# Patient Record
Sex: FEMALE | Race: WHITE
[De-identification: names, ages, dates, MRNs, and addresses within clinical notes are randomized per-mention and may not be internally consistent; named-entity substitution may affect disease eponyms.]

---

## 2019-08-07 ENCOUNTER — HOSPITAL ENCOUNTER (INPATIENT)
Dept: HOSPITAL 88 - ER | Age: 84
LOS: 3 days | Discharge: HOME | DRG: 291 | End: 2019-08-10
Attending: INTERNAL MEDICINE | Admitting: INTERNAL MEDICINE
Payer: MEDICARE

## 2019-08-07 VITALS — DIASTOLIC BLOOD PRESSURE: 67 MMHG | SYSTOLIC BLOOD PRESSURE: 149 MMHG

## 2019-08-07 VITALS — HEIGHT: 63 IN | BODY MASS INDEX: 28.71 KG/M2 | WEIGHT: 162.06 LBS

## 2019-08-07 VITALS — SYSTOLIC BLOOD PRESSURE: 149 MMHG | DIASTOLIC BLOOD PRESSURE: 67 MMHG

## 2019-08-07 DIAGNOSIS — G62.9: ICD-10-CM
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
## 2019-08-07 DIAGNOSIS — K21.9: ICD-10-CM

## 2019-08-07 DIAGNOSIS — I45.2: ICD-10-CM

## 2019-08-07 DIAGNOSIS — Z91.81: ICD-10-CM

## 2019-08-07 DIAGNOSIS — J44.1: ICD-10-CM

## 2019-08-07 DIAGNOSIS — F32.9: ICD-10-CM

## 2019-08-07 DIAGNOSIS — I50.33: ICD-10-CM

## 2019-08-07 DIAGNOSIS — E86.0: ICD-10-CM

## 2019-08-07 DIAGNOSIS — N28.9: ICD-10-CM

## 2019-08-07 DIAGNOSIS — N14.1: ICD-10-CM

## 2019-08-07 DIAGNOSIS — R53.1: ICD-10-CM

## 2019-08-07 DIAGNOSIS — J98.6: ICD-10-CM

## 2019-08-07 DIAGNOSIS — Z87.891: ICD-10-CM

## 2019-08-07 DIAGNOSIS — I95.1: ICD-10-CM

## 2019-08-07 DIAGNOSIS — N18.3: ICD-10-CM

## 2019-08-07 DIAGNOSIS — N32.81: ICD-10-CM

## 2019-08-07 DIAGNOSIS — M81.0: ICD-10-CM

## 2019-08-07 DIAGNOSIS — Z79.82: ICD-10-CM

## 2019-08-07 DIAGNOSIS — M15.9: ICD-10-CM

## 2019-08-07 DIAGNOSIS — Z99.81: ICD-10-CM

## 2019-08-07 DIAGNOSIS — T50.8X5A: ICD-10-CM

## 2019-08-07 DIAGNOSIS — I13.0: Primary | ICD-10-CM

## 2019-08-07 LAB
ALBUMIN SERPL-MCNC: 4.4 G/DL (ref 3.5–5)
ALBUMIN/GLOB SERPL: 1.2 {RATIO} (ref 0.8–2)
ALP SERPL-CCNC: 43 IU/L (ref 40–150)
ALT SERPL-CCNC: 14 IU/L (ref 0–55)
ANION GAP SERPL CALC-SCNC: 21.3 MMOL/L (ref 8–16)
BACTERIA URNS QL MICRO: (no result) /HPF
BASOPHILS # BLD AUTO: 0.1 10*3/UL (ref 0–0.1)
BASOPHILS NFR BLD AUTO: 0.5 % (ref 0–1)
BILIRUB UR QL: NEGATIVE
BUN SERPL-MCNC: 39 MG/DL (ref 7–26)
BUN/CREAT SERPL: 16 (ref 6–25)
CALCIUM SERPL-MCNC: 10.6 MG/DL (ref 8.4–10.2)
CHLORIDE SERPL-SCNC: 96 MMOL/L (ref 98–107)
CK MB SERPL-MCNC: 4.5 NG/ML (ref 0–5)
CK SERPL-CCNC: 175 IU/L (ref 30–200)
CLARITY UR: (no result)
CO2 SERPL-SCNC: 29 MMOL/L (ref 22–29)
COARSE GRAN CASTS URNS QL MICRO: (no result)
COLOR UR: YELLOW
DEPRECATED APTT PLAS QN: 25.5 SECONDS (ref 23.8–35.5)
DEPRECATED INR PLAS: 0.91
DEPRECATED NEUTROPHILS # BLD AUTO: 7.1 10*3/UL (ref 2.1–6.9)
DEPRECATED RBC URNS MANUAL-ACNC: (no result) /HPF (ref 0–5)
EGFRCR SERPLBLD CKD-EPI 2021: 25 ML/MIN (ref 60–?)
EOSINOPHIL # BLD AUTO: 0.2 10*3/UL (ref 0–0.4)
EOSINOPHIL NFR BLD AUTO: 2.3 % (ref 0–6)
EPI CELLS URNS QL MICRO: (no result) /LPF
ERYTHROCYTE [DISTWIDTH] IN CORD BLOOD: 13.5 % (ref 11.7–14.4)
GLOBULIN PLAS-MCNC: 3.6 G/DL (ref 2.3–3.5)
GLUCOSE SERPLBLD-MCNC: 93 MG/DL (ref 74–118)
HCT VFR BLD AUTO: 40.9 % (ref 38.2–49.6)
HGB BLD-MCNC: 12.9 G/DL (ref 14–18)
HYALINE CASTS #/AREA URNS LPF: (no result) /[LPF] (ref 0–1)
KETONES UR QL STRIP.AUTO: NEGATIVE
LEUKOCYTE ESTERASE UR QL STRIP.AUTO: NEGATIVE
LYMPHOCYTES # BLD: 2 10*3/UL (ref 1–3.2)
LYMPHOCYTES NFR BLD AUTO: 19.6 % (ref 18–39.1)
MCH RBC QN AUTO: 29.2 PG (ref 28–32)
MCHC RBC AUTO-ENTMCNC: 31.5 G/DL (ref 31–35)
MCV RBC AUTO: 92.5 FL (ref 81–99)
MONOCYTES # BLD AUTO: 0.9 10*3/UL (ref 0.2–0.8)
MONOCYTES NFR BLD AUTO: 8.6 % (ref 4.4–11.3)
NEUTS SEG NFR BLD AUTO: 68.7 % (ref 38.7–80)
NITRITE UR QL STRIP.AUTO: NEGATIVE
PLATELET # BLD AUTO: 362 X10E3/UL (ref 140–360)
POTASSIUM SERPL-SCNC: 4.3 MMOL/L (ref 3.5–5.1)
PROT UR QL STRIP.AUTO: NEGATIVE
PROTHROMBIN TIME: 12.7 SECONDS (ref 11.9–14.5)
RBC # BLD AUTO: 4.42 X10E6/UL (ref 4.3–5.7)
SODIUM SERPL-SCNC: 142 MMOL/L (ref 136–145)
SP GR UR STRIP: 1.01 (ref 1.01–1.02)
TSH SERPL DL<=0.005 MIU/L-ACNC: 2.77 UIU/ML (ref 0.35–4.94)
UROBILINOGEN UR STRIP-MCNC: 0.2 MG/DL (ref 0.2–1)
WBC #/AREA URNS HPF: (no result) /HPF (ref 0–5)

## 2019-08-07 PROCEDURE — 80053 COMPREHEN METABOLIC PANEL: CPT

## 2019-08-07 PROCEDURE — 80061 LIPID PANEL: CPT

## 2019-08-07 PROCEDURE — 87086 URINE CULTURE/COLONY COUNT: CPT

## 2019-08-07 PROCEDURE — 97139 UNLISTED THERAPEUTIC PX: CPT

## 2019-08-07 PROCEDURE — 83880 ASSAY OF NATRIURETIC PEPTIDE: CPT

## 2019-08-07 PROCEDURE — 80048 BASIC METABOLIC PNL TOTAL CA: CPT

## 2019-08-07 PROCEDURE — 84443 ASSAY THYROID STIM HORMONE: CPT

## 2019-08-07 PROCEDURE — 94640 AIRWAY INHALATION TREATMENT: CPT

## 2019-08-07 PROCEDURE — 85025 COMPLETE CBC W/AUTO DIFF WBC: CPT

## 2019-08-07 PROCEDURE — 87040 BLOOD CULTURE FOR BACTERIA: CPT

## 2019-08-07 PROCEDURE — 83605 ASSAY OF LACTIC ACID: CPT

## 2019-08-07 PROCEDURE — 85730 THROMBOPLASTIN TIME PARTIAL: CPT

## 2019-08-07 PROCEDURE — 82553 CREATINE MB FRACTION: CPT

## 2019-08-07 PROCEDURE — 99284 EMERGENCY DEPT VISIT MOD MDM: CPT

## 2019-08-07 PROCEDURE — 82550 ASSAY OF CK (CPK): CPT

## 2019-08-07 PROCEDURE — 81001 URINALYSIS AUTO W/SCOPE: CPT

## 2019-08-07 PROCEDURE — 71045 X-RAY EXAM CHEST 1 VIEW: CPT

## 2019-08-07 PROCEDURE — 85610 PROTHROMBIN TIME: CPT

## 2019-08-07 PROCEDURE — 84484 ASSAY OF TROPONIN QUANT: CPT

## 2019-08-07 PROCEDURE — 70450 CT HEAD/BRAIN W/O DYE: CPT

## 2019-08-07 PROCEDURE — 36415 COLL VENOUS BLD VENIPUNCTURE: CPT

## 2019-08-07 PROCEDURE — 93306 TTE W/DOPPLER COMPLETE: CPT

## 2019-08-07 PROCEDURE — 93005 ELECTROCARDIOGRAM TRACING: CPT

## 2019-08-07 RX ADMIN — SODIUM CHLORIDE SCH MLS/HR: 9 INJECTION, SOLUTION INTRAVENOUS at 20:25

## 2019-08-07 NOTE — XMS REPORT
Patient Summary Document

                             Created on: 2019



JARRETT BEY

External Reference #: [id number]

: 1935

Sex: Female



Demographics







                          Address                   [address] [place]  [address]

 

                          Home Phone                [phone]

 

                          Preferred Language        Unknown

 

                          Marital Status            Unknown

 

                          Scientologist Affiliation     Unknown

 

                          Race                      Unknown

 

                                        Additional Race(s)  

 

                          Ethnic Group              Unknown





Author







                          Author                    [facility]

 

                          [facility]

 

                          Address                   Unknown

 

                          Phone                     Unavailable







Care Team Providers







                    Care Team Member Name    Role                Phone

 

                    SWEET, A LAIRD      Unavailable         Unavailable







Problems

This patient has no known problems.



Allergies, Adverse Reactions, Alerts

This patient has no known allergies or adverse reactions.



Medications

This patient has no known medications.



Encounters







             Start Date/Time    End Date/Time    Encounter Type    Admission Type    Attending [facility]

                    Care Facility       Care Department     Encounter ID

 

        2019-07-10 07:48:00    2019-07-10 07:48:00    Emergency    E               [facility]    [id number]

 

        2019 07:27:00    2019 07:27:00    [id number]

 

        2019 15:06:00    2019 15:06:00    Outpatient                    [facility]    [facility]     7513

 

        2019 13:50:00    2019 13:50:00    Outpatient                    [facility]    [facility]     [id number]







Results







           Test Description    Test Time    Test Comments    Text Results    Atomic Results    Result

 Comments

 

                CT BRAIN WO     2019 17:21:00                                                               

                                           [place] [address]    
 Patient Name: JARRETT BEY                                   MR #: 
[id number]                     : 1935                                  
Age/Sex: 83/F  Acct #: [id number]                              Req #: [id number]
[id number]  Adm Physician:                                                      
Ordered by: NICHOLAS NEIL NP                            Report #: [id number]  
     Location: ER                                      Room/Bed:                
    
___________________________________________________________________________________________________
   Procedure: [id number] CT/CT BRAIN WO  Exam Date: 19                     
      Exam Time: 1657                                              REPORT 
STATUS: Signed    Examination: CT head without contrast   Clinical Indication: 
Fainting. Headache.   Technique: Transaxial noncontrast images from the skull 
base through the vertex   were obtained. Sagittal and coronal reformatted images
were done.   Dose modulation, iterative reconstruction, and/or weight based 
adjustment of   the mA/kV was utilized to reduce the radiation dose to as low as
reasonably   achievable.    Comparison: None.      Findings:      Scalp: No 
abnormalities.   Bones: Intact. No fractures.  No blastic or lytic lesions.     
 Brain sulci: Appropriate for patient's age.   Ventricles: Normal in size and 
configuration.   No hydrocephalus.  .      Extra-axial space:   No 
abnormalities.      Parenchyma:    There are mild confluent areas of low-
attenuation within subcortical and   periventricular white matter, nonspecific, 
but could represent microvascular   ischemic disease.   No masses, hemorrhage, 
or acute or chronic cortical based vascular insults.      Suprasellar region: No
abnormalities.   Craniocervical junction: The foramen magnum is patent.  No Chi
jt one   malformation.      Incidental findings:    Atherosclerotic 
calcification of the supraclinoid internal carotid arteries.      Impression:   
  1.  No acute intracranial finding.      2.  Mild chronic microvascular 
ischemic change.      Signed by: Dr. Zaira Panchal M.D. on 2019 5:25 PM 
      Dictated By: ZAIRA GARCIA MD  Electronically Signed By: 
ZAIRA GARCIA MD on 19  Transcribed By: PAWEL on 
19       COPY TO:   NICHOLAS NEIL NP              

 

                CHEST SINGLE (PORTABLE)    2019 17:10:00                                                   

                                                       [place] [address]      Patient Name: JARRETT BEY                       
           MR #: [id number]                     : 1935                 
                 Age/Sex: 83/F  Acct #: [id number]                            
 Req #: [id number]  Adm Physician:                                              
       Ordered by: NICHOLAS NEIL NP                            Report #: 
[id number]        Location: ER                                      Room/Bed:    
                
___________________________________________________________________________________________________
   Procedure: [id number] DX/CHEST SINGLE (PORTABLE)  Exam Date: 19         
                  Exam Time: 1701                                              
REPORT STATUS: Signed    EXAMINATION:  CHEST SINGLE (PORTABLE)          IN
DICATION: Shortness of breath      COMPARISON: None           FINDINGS:      
LINES/TUBES:EKG. Sternotomy wires intact.      LUNGS:The lungs are moderately 
inflated. There is marked elevation of the right   hemidiaphragm. No focal 
consolidation or pulmonary edema.      PLEURA:No pleural effusion or 
pneumothorax.      MEDIASTINUM:The cardiomediastinal silhouette appears normal 
in size and shape.   Atherosclerotic calcifications of the thoracic aorta.      
BONES/SOFT TISSUES:No acute osseous injury.      ABDOMEN:No free air under the 
diaphragm.         IMPRESSION:    No focal pneumonia or pulmonary edema.      
Marked elevation of the right hemidiaphragm.      Signed by: Lilia Pena MD on 
2019 5:12 PM        Dictated By: LILIA PENA MD  Electronically Signed By: 
LILIA PENA MD on 19  Transcribed By: PAWEL on 19       
COPY TO:   NICHOLAS NIEL NP

## 2019-08-07 NOTE — DIAGNOSTIC IMAGING REPORT
Examination: CT head without contrast

Clinical Indication: Fainting. Headache.

Technique: Transaxial noncontrast images from the skull base through the vertex

were obtained. Sagittal and coronal reformatted images were done.

Dose modulation, iterative reconstruction, and/or weight based adjustment of

the mA/kV was utilized to reduce the radiation dose to as low as reasonably

achievable. 

Comparison: None.



Findings:



Scalp: No abnormalities.

Bones: Intact. No fractures.  No blastic or lytic lesions. 



Brain sulci: Appropriate for patient's age.

Ventricles: Normal in size and configuration.   No hydrocephalus.  .



Extra-axial space:

No abnormalities.



Parenchyma: 

There are mild confluent areas of low-attenuation within subcortical and

periventricular white matter, nonspecific, but could represent microvascular

ischemic disease.

No masses, hemorrhage, or acute or chronic cortical based vascular insults.



Suprasellar region: No abnormalities.

Craniocervical junction: The foramen magnum is patent.  No Chiari one

malformation.



Incidental findings: 

Atherosclerotic calcification of the supraclinoid internal carotid arteries.



Impression:



1.  No acute intracranial finding.



2.  Mild chronic microvascular ischemic change.



Signed by: Dr. Zaira Panchal M.D. on 8/7/2019 5:25 PM

## 2019-08-07 NOTE — XMS REPORT
Continuity of Care Document

                             Created on: 2019



JARRETT BEY

External Reference #: [id number]

: 1935

Sex: Female



Demographics







                          Address                   [address] [place] [address]  [address]

 

                          Home Phone                [phone]

 

                          Preferred Language        English

 

                          Marital Status            Unknown

 

                          Amish Affiliation     Unknown

 

                          Race                      Unknown

 

                          Ethnic Group              Unknown





Author







                          Author                    [employer]

 

                          Address                   Unknown

 

                          Phone                     Unavailable







Care Team Providers







                    Care Team Member Name    Role                Phone

 

                    [email or web address]    Unavailable         Unavailable



                                    



Problems

                    





                    Problem                            Status                            Onset Date     

                          Classification                            Date Reported       

                          Comments                            Source                    

 

                          Right bundle branch block                            Active                       

                                                Problem                            2016         

                                                      Rakesh Zaman [facility], [place]                  

  

 

                    Aortic valve disorders                            Active                            

                            Problem                            2016            

                                                      Rakesh Zaman MD, [place]                    

 

                          Chronic diastolic heart failure                            Active                 

                                                Problem                            2016   

                                                      Rakesh Zaman MD, [facility]            

        

 

                          Chronic diastolic heart failure                            Active                 

                                                Problem                            2016   

                                                      Rakesh Zaman MD, [place]            

        

 

                          Unspecified right bundle-branch block                            Active           

                                                Problem                            2016

                                                        Rakesh Zaman MD, [facility]         

           

 

                          Obstructive hypertrophic cardiomyopathy                            Active         

                                                Problem                            2016

                                                        Rakesh Zaman MD, [place]         

           

 

                          Orthostatic hypotension                            Active                         

                                                Problem                            2016           

                                                      Rakesh Zaman MD, [place]                    



 

                    Angina pectoris                            Active                                   

                          Problem                            2016                   

                                                      Rakesh Zaman MD, [place]                    

 

                          Orthostatic hypotension                            Active                         

                                                Problem                            2016           

                                                      Rakesh Zaman MD, [place]                    



 

                          Chronic obstructive pulmonary disease, unspecified                            Active

                                                        Problem                      

                    2016                                                        Rakesh Zaman MD,

 [place]                    



                                                                                
                                                                                
                                                      



Medications

        





                                        No Data Provided for This Section                    



                                                                



Allergies, Adverse Reactions, Alerts

        





                                        No Known Medication Allergies                      



                                                        



Immunizations

        





                                        No Data Provided for This Section



                                     



Results







                                        No Data Provided for This Section



                    



Pathology Reports







                                        No Data Provided for This Section                    



                            



Diagnostic Reports

            





                                        No Data Provided for This Section                    



                                                            



Consultation Notes

                    





                                        No Data Provided for This Section                    



                                                            



Discharge Summaries

                    





                                        No Data Provided for This Section                    



                                                            



History and Physicals

                    





                                        No Data Provided for This Section                    



                                                                



Vital Signs

                         





                                        No Data Provided for This Section



                                                                 



Encounters

                    





                    Location                            Location Details                            Encounter

 Type                            Encounter Number                            Reason For

 Visit                            Attending Provider                            ADM Date

                            DC Date                            Status                

                                        Source                    

 

                    Rakesh Zaman MD, [facility]                                                        Follow-Up

                                        [id number]                   

                                                                              10/07/2015          

                    10/07/2015                                                        Rakesh Zaman MD, [facility]                    

 

                    Rakesh Zaman MD, PA                                                        reschedule

 appt                                   [id number]              

                                                                              11/10/2016     

                          11/10/2016                                                    

                                        Rakesh Zaman MD, [facility]                    



                                                                                
   



Procedures

        





                                        No Data Provided for This Section



                                                    



Assessment and Plan

                    





                                        No Data Provided for This Section                    



                                     



Plan of Care







                                        No Data Provided for This Section                    



                                                                



Social History

                    





                    Social History                            Date                            Source    

                

 

                                        Social History ElementQualifiersDate Reported

Tobacco Use:

                                        .  Are you a: former smoker quit around 1990

Oct 07, 2015

Marital Status:

                                        .   as of 2013.

Oct 07, 2015

Do you drink alcohol?

                                        .  Status: No

Oct 07, 2015

                            10/07/2015                            Rakesh Zaman MD, [place]

                    



                                                                    



Family History

                    





                                        No Data Provided for This Section                    



                                                            



Advance Directives

                    





                                        No Data Provided for This Section                    



                                                            



Functional Status

                    





                                        No Data Provided for This Section

## 2019-08-07 NOTE — NUR
Received report from LEAH Frias nurse. Patient is A&Ox3. Patient in no pain or distress. 
Call light within reach. Daughter at bedside.

## 2019-08-07 NOTE — DIAGNOSTIC IMAGING REPORT
EXAMINATION:  CHEST SINGLE (PORTABLE)    



INDICATION: Shortness of breath



COMPARISON: None

     

FINDINGS:



LINES/TUBES:EKG. Sternotomy wires intact.



LUNGS:The lungs are moderately inflated. There is marked elevation of the right

hemidiaphragm. No focal consolidation or pulmonary edema.



PLEURA:No pleural effusion or pneumothorax.



MEDIASTINUM:The cardiomediastinal silhouette appears normal in size and shape.

Atherosclerotic calcifications of the thoracic aorta.



BONES/SOFT TISSUES:No acute osseous injury.



ABDOMEN:No free air under the diaphragm.





IMPRESSION: 

No focal pneumonia or pulmonary edema.



Marked elevation of the right hemidiaphragm.



Signed by: Christoph Hidalgo MD on 8/7/2019 5:12 PM

## 2019-08-07 NOTE — NUR
STRAIGHT CATH INSERTED VIA ASEPTIC TECHNIQUE FOR UA PER MD ORDER; PT URINE 
OUTPUT APPROX 50 CC; UA COLLECTED AND SENT TO LAB

## 2019-08-07 NOTE — XMS REPORT
Rakesh Zaman MD, [facility]

                             Created on: 2016



Brigida Martin

External Reference #: [id number]

: 1935

Sex: Female



Demographics







                          Address                   [address] [place]  [address]

 

                          Home Phone                [phone]

 

                          Preferred Language        Unknown

 

                          Marital Status            Unknown

 

                          Mandaen Affiliation     Unknown

 

                          Race                      Unknown

 

                          Ethnic Group              Non-





Author







                          Author                    Rakesh Zaman

 

                          Organization              [employer]

 

                          Address                   Unknown

 

                          Phone                     Unavailable







Care Team Providers







                    Care Team Member Name    Role                Phone

 

                    Rakesh Zaman    CP                  Unavailable



                                            



Encounters

          





                    Encounter           Location            Date

 

                    Follow-Up           [facility]    Oct 07, 2015

 

                    reschedule appt     [facility], [facility]    Nov 10, 2016



                                                                                
                 



Problems

          





             Problem Type    Condition    ICD-9 Code    Onset Dates    Condition Status

 

             Problem      Right bundle branch block    426.4                     Active

 

             Problem      Aortic valve disorders    424.1                     Active

 

             Problem      Chronic diastolic heart failure    428.32                    Active

 

             Problem      Chronic diastolic (congestive) heart failure    I50.32                    Active

 

             Problem      Unspecified right bundle-branch block    I45.10                    Active

 

             Problem      Obstructive hypertrophic cardiomyopathy    I42.1                     Active

 

             Problem      Orthostatic hypotension    458.0                     Active

 

             Problem      Angina pectoris    413.9                     Active

 

             Problem      Orthostatic hypotension    I95.1                     Active

 

             Problem      Chronic obstructive pulmonary disease, unspecified    J44.9                     Active



                                                                                
                                                                                
                



Social History

          





                    Social History Element    Qualifiers          Date Reported

 

                    Tobacco Use:        .  Are you a: former smoker quit around 1990    Oct 07, 2015

 

                    Marital Status:     .   as of 2013.    Oct 07, 2015

 

                    Do you drink alcohol?    .  Status: No       Oct 07, 2015



                                                                                
       



Summary Purpose

          [employer] Submission

## 2019-08-07 NOTE — XMS REPORT
Continuity of Care Document

                             Created on: 2019



JARRETT BEY

External Reference #: [id number]

: 1935

Sex: Female



Demographics







                          Address                   [address] [place] [address]  [id number]

 

                          Home Phone                [phone]

 

                          Preferred Language        English

 

                          Marital Status            Unknown

 

                          Mandaeism Affiliation     Unknown

 

                          Race                      Unknown

 

                          Ethnic Group              Unknown





Author







                          Author                    [employer]

 

                          Address                   Unknown

 

                          Phone                     Unavailable







Care Team Providers







                    Care Team Member Name    Role                Phone

 

                    [employer]    Unavailable         Unavailable



                                    



Problems

                    





                    Problem                            Status                            Onset Date     

                          Classification                            Date Reported       

                          Comments                            Source                    

 

                          Right bundle branch block                            Active                       

                                                Problem                            2016         

                                                      Rakesh Zaman MD, PA                  

  

 

                    Aortic valve disorders                            Active                            

                            Problem                            2016            

                                                      Rakesh Zaman MD, PA                    

 

                          Chronic diastolic heart failure                            Active                 

                                                Problem                            2016   

                                                      Rakesh Zaman MD, PA            

        

 

                          Chronic diastolic heart failure                            Active                 

                                                Problem                            2016   

                                                      Rakesh Zaman MD, PA            

        

 

                          Unspecified right bundle-branch block                            Active           

                                                Problem                            2016

                                                        Rakesh Zaman MD, PA         

           

 

                          Obstructive hypertrophic cardiomyopathy                            Active         

                                                Problem                            2016

                                                        Rakesh Zaman MD, PA         

           

 

                          Orthostatic hypotension                            Active                         

                                                Problem                            2016           

                                                      Rakesh Zaman MD, PA                    



 

                    Angina pectoris                            Active                                   

                          Problem                            2016                   

                                                      Rakesh Zaman MD, PA                    

 

                          Orthostatic hypotension                            Active                         

                                                Problem                            2016           

                                                      Rakesh Zaman MD, PA                    



 

                          Chronic obstructive pulmonary disease, unspecified                            Active

                                                        Problem                      

                    2016                                                        Rakesh Zaman MD,

 PA                    



                                                                                
                                                                                
                                                      



Medications

        





                                        No Data Provided for This Section                    



                                                                



Allergies, Adverse Reactions, Alerts

        





                                        No Known Medication Allergies                      



                                                        



Immunizations

        





                                        No Data Provided for This Section



                                     



Results







                                        No Data Provided for This Section



                    



Pathology Reports







                                        No Data Provided for This Section                    



                            



Diagnostic Reports

            





                                        No Data Provided for This Section                    



                                                            



Consultation Notes

                    





                                        No Data Provided for This Section                    



                                                            



Discharge Summaries

                    





                                        No Data Provided for This Section                    



                                                            



History and Physicals

                    





                                        No Data Provided for This Section                    



                                                                



Vital Signs

                         





                                        No Data Provided for This Section



                                                                 



Encounters

                    





                    Location                            Location Details                            Encounter

 Type                            Encounter Number                            Reason For

 Visit                            Attending Provider                            ADM Date

                            DC Date                            Status                

                                        Source                    

 

                    Rakesh Zaman MD, PA                                                        Follow-Up

                                        [id number]                   

                                                                              10/07/2015          

                    10/07/2015                                                        Rakesh Zaman MD, PA                    

 

                    Rakesh Zaman MD, PA                                                        reschedule

 appt                                   [id number]              

                                                                              11/10/2016     

                          11/10/2016                                                    

                                        Rakesh Zaman MD, PA                    



                                                                                
   



Procedures

        





                                        No Data Provided for This Section



                                                    



Assessment and Plan

                    





                                        No Data Provided for This Section                    



                                     



Plan of Care







                                        No Data Provided for This Section                    



                                                                



Social History

                    





                    Social History                            Date                            Source    

                

 

                                        Social History ElementQualifiersDate Reported

Tobacco Use:

                                        .  Are you a: former smoker quit around 1990

Oct 07, 2015

Marital Status:

                                        .   as of 2013.

Oct 07, 2015

Do you drink alcohol?

                                        .  Status: No

Oct 07, 2015

                            10/07/2015                            Rakesh Zaman MD, PA

                    



                                                                    



Family History

                    





                                        No Data Provided for This Section                    



                                                            



Advance Directives

                    





                                        No Data Provided for This Section                    



                                                            



Functional Status

                    





                                        No Data Provided for This Section

## 2019-08-08 VITALS — DIASTOLIC BLOOD PRESSURE: 58 MMHG | SYSTOLIC BLOOD PRESSURE: 128 MMHG

## 2019-08-08 VITALS — DIASTOLIC BLOOD PRESSURE: 63 MMHG | SYSTOLIC BLOOD PRESSURE: 124 MMHG

## 2019-08-08 VITALS — DIASTOLIC BLOOD PRESSURE: 66 MMHG | SYSTOLIC BLOOD PRESSURE: 149 MMHG

## 2019-08-08 VITALS — SYSTOLIC BLOOD PRESSURE: 103 MMHG | DIASTOLIC BLOOD PRESSURE: 51 MMHG

## 2019-08-08 VITALS — DIASTOLIC BLOOD PRESSURE: 58 MMHG | SYSTOLIC BLOOD PRESSURE: 122 MMHG

## 2019-08-08 VITALS — DIASTOLIC BLOOD PRESSURE: 60 MMHG | SYSTOLIC BLOOD PRESSURE: 127 MMHG

## 2019-08-08 LAB
ALBUMIN SERPL-MCNC: 3.1 G/DL (ref 3.5–5)
ALBUMIN/GLOB SERPL: 1.2 {RATIO} (ref 0.8–2)
ALP SERPL-CCNC: 34 IU/L (ref 40–150)
ALT SERPL-CCNC: 10 IU/L (ref 0–55)
ANION GAP SERPL CALC-SCNC: 14.8 MMOL/L (ref 8–16)
BASOPHILS # BLD AUTO: 0 10*3/UL (ref 0–0.1)
BASOPHILS NFR BLD AUTO: 0.4 % (ref 0–1)
BUN SERPL-MCNC: 32 MG/DL (ref 7–26)
BUN/CREAT SERPL: 19 (ref 6–25)
CALCIUM SERPL-MCNC: 8.6 MG/DL (ref 8.4–10.2)
CHLORIDE SERPL-SCNC: 105 MMOL/L (ref 98–107)
CHOLEST SERPL-MCNC: 149 MD/DL (ref 0–199)
CHOLEST/HDLC SERPL: 3.5 {RATIO} (ref 3–3.6)
CK MB SERPL-MCNC: 4 NG/ML (ref 0–5)
CK MB SERPL-MCNC: 5 NG/ML (ref 0–5)
CK MB SERPL-MCNC: 5.1 NG/ML (ref 0–5)
CK SERPL-CCNC: 101 IU/L (ref 29–168)
CK SERPL-CCNC: 104 IU/L (ref 29–168)
CK SERPL-CCNC: 114 IU/L (ref 29–168)
CO2 SERPL-SCNC: 25 MMOL/L (ref 22–29)
DEPRECATED NEUTROPHILS # BLD AUTO: 4.5 10*3/UL (ref 2.1–6.9)
EGFRCR SERPLBLD CKD-EPI 2021: 29 ML/MIN (ref 60–?)
EOSINOPHIL # BLD AUTO: 0.3 10*3/UL (ref 0–0.4)
EOSINOPHIL NFR BLD AUTO: 3.7 % (ref 0–6)
ERYTHROCYTE [DISTWIDTH] IN CORD BLOOD: 13.5 % (ref 11.7–14.4)
GLOBULIN PLAS-MCNC: 2.6 G/DL (ref 2.3–3.5)
GLUCOSE SERPLBLD-MCNC: 94 MG/DL (ref 74–118)
HCT VFR BLD AUTO: 33.2 % (ref 34.2–44.1)
HDLC SERPL-MSCNC: 42 MG/DL (ref 40–60)
HGB BLD-MCNC: 10.2 G/DL (ref 12–16)
LDLC SERPL CALC-MCNC: 70 MG/DL (ref 60–130)
LYMPHOCYTES # BLD: 1.9 10*3/UL (ref 1–3.2)
LYMPHOCYTES NFR BLD AUTO: 25.8 % (ref 18–39.1)
MCH RBC QN AUTO: 28.6 PG (ref 28–32)
MCHC RBC AUTO-ENTMCNC: 30.7 G/DL (ref 31–35)
MCV RBC AUTO: 93 FL (ref 81–99)
MONOCYTES # BLD AUTO: 0.7 10*3/UL (ref 0.2–0.8)
MONOCYTES NFR BLD AUTO: 8.8 % (ref 4.4–11.3)
NEUTS SEG NFR BLD AUTO: 60.8 % (ref 38.7–80)
PLATELET # BLD AUTO: 265 X10E3/UL (ref 140–360)
POTASSIUM SERPL-SCNC: 3.8 MMOL/L (ref 3.5–5.1)
RBC # BLD AUTO: 3.57 X10E6/UL (ref 3.6–5.1)
SODIUM SERPL-SCNC: 141 MMOL/L (ref 136–145)
TRIGL SERPL-MCNC: 186 MG/DL (ref 0–149)

## 2019-08-08 RX ADMIN — SODIUM CHLORIDE SCH MLS/HR: 9 INJECTION, SOLUTION INTRAVENOUS at 04:20

## 2019-08-08 RX ADMIN — IPRATROPIUM BROMIDE AND ALBUTEROL SULFATE SCH ML: .5; 2.5 SOLUTION RESPIRATORY (INHALATION) at 11:00

## 2019-08-08 RX ADMIN — IPRATROPIUM BROMIDE AND ALBUTEROL SULFATE SCH ML: .5; 2.5 SOLUTION RESPIRATORY (INHALATION) at 15:50

## 2019-08-08 RX ADMIN — IPRATROPIUM BROMIDE AND ALBUTEROL SULFATE SCH ML: .5; 2.5 SOLUTION RESPIRATORY (INHALATION) at 08:30

## 2019-08-08 RX ADMIN — FAMOTIDINE SCH MG: 10 INJECTION, SOLUTION INTRAVENOUS at 21:07

## 2019-08-08 RX ADMIN — IPRATROPIUM BROMIDE AND ALBUTEROL SULFATE SCH ML: .5; 2.5 SOLUTION RESPIRATORY (INHALATION) at 20:10

## 2019-08-08 RX ADMIN — FAMOTIDINE SCH MG: 10 INJECTION, SOLUTION INTRAVENOUS at 10:21

## 2019-08-08 RX ADMIN — IPRATROPIUM BROMIDE AND ALBUTEROL SULFATE SCH ML: .5; 2.5 SOLUTION RESPIRATORY (INHALATION) at 23:00

## 2019-08-08 NOTE — NUR
ASSESSMENT: Spiritual concern

 referred by RN thru consult request. Pt dismayed by new diagnosis. Pt states she 
wasn't aware of the extent of her illness until hospitalization. Pt states she has two 
daughters, one with physical needs who lives with her and the other who "lives in [place]."



Intervention:  Provided unhurried empathic listening. Facilitated illness review and 
identification of resources. Provided information on how to contact , if needed.



Outcome: Pt expressed appreciation for visit. No need to follow at this time.



MITCH MURPHY



Spiritual Care Department

O: [phone]

Pager: [phone] ([phone] calling [phone])

## 2019-08-08 NOTE — NUR
REC'D PT AAOX3, ON O2 VIA NC AT 4L/MIN. NO S/S OF DISTRESS. WALKER AT BEDSIDE. IV FLUIDS 
RUNNING. BED IN LOWEST POSITION, SIDE RAILS UPX2, AND CALL BELL WITHIN REACH.

## 2019-08-08 NOTE — HISTORY AND PHYSICAL
CHIEF COMPLAINT:  "I keep falling down."

 

HISTORY OF PRESENT ILLNESS:  This is an 83-year-old white woman, who presents to
[facility] Emergency Room with a 2 to 3-day history of 
worsening

generalized weakness and complaints of frequent falls.  The patient also states 
that

since last night, she become more short of breath.  When she arrived in the 
emergency

room last night, she was found to have BUN and creatinine of 39 and 2.5 
respectively.

The patient had blood work done in 2018, at that time was found to 
have a BUN

and creatinine of 22 and 1.25 respectively.  However, 10 days ago, the patient 
underwent

a right and left heart catheterization at [facility] in the [facility].  Also in the emergency room, the patient was found to have white blood 
cell

count 10,300 with 68% segmented neutrophils.  The patient's hemoglobin is 12.9 
g/dL.

This morning after intravenous fluids, the patient's BUN and creatinine did 
improve to

32 and 1.69 respectively.  The patient underwent a 12-lead EKG in the emergency 
room

that revealed normal sinus rhythm with bifascicular block.  The patient had 
three sets

of cardiac enzymes, namely troponin I in the emergency room that were not 
consistent for

acute myocardial ischemia or infarction.  The patient's B-type natriuretic 
peptide level

was slightly elevated at 108.  The patient's LDL cholesterol was 70 mg/dL.  
Chest x-ray

performed in the emergency room revealed no focal pneumonia or pulmonary edema, 
but this

is prior to the intravenous fluids administered overnight.  CT of the brain 
without

contrast in the emergency room did not reveal any acute intracranial finding.  
The

patient was admitted for further evaluation and treatment. 

 

REVIEW OF SYSTEMS:

GENERAL:  Weight is stable.  No fever or chills, but complains generalized 
weakness for

the last week.  She also states she has had two falls in the last few days. 

HEENT:  No headaches.  No vision changes. 

CARDIOVASCULAR/RESPIRATORY:  The patient states worsening shortness of breath 
since

early this morning, but she did receive intravenous fluids all night.  No chest 
pain or

tightness. 

GI:  No nausea, vomiting, diarrhea, or constipation. 

:  No dysuria, hematuria, or incontinence. 

NEUROMUSCULAR:  No focal limb weakness, but complains of generalized weakness.  
She has

also had two falls last couple of days as previously stated. 

 

ALLERGIES:  NO KNOWN DRUG ALLERGIES.

 

HOME MEDICATIONS:  

1. Simvastatin 40 mg at bedtime.

2. Nortriptyline 25 mg b.i.d.

3. Sertraline 50 mg daily.

4. Melatonin 10 mg at bedtime p.r.n. insomnia.

5. Metoprolol tartrate 25 mg b.i.d.

6. Doxylamine 25 mg once daily.

7. Aspirin 81 mg daily.

8. Albuterol nebulized treatments four times a day as needed for shortness of 
breath and

wheezing. 

9. Tramadol 50 mg b.i.d.

10. Supplemental oxygen 4 L a minute.

11. Lorazepam 1 mg b.i.d. p.r.n. anxiety.

 

PAST MEDICAL HISTORY:  

1. Chronic diastolic congestive heart failure.

2. Pulmonary hypertension.

3. COPD.

4. Previous heavy tobacco smoker.

5. Chronic oxygen use.

6. Hypertensive heart disease.

7. Stage 3 chronic kidney disease.

8. Peripheral neuropathy.

9. Generalized osteoarthritis.

10. Major depressive disorder.

11. GERD.

12. Anxiety disorder.

13. Overactive bladder.

14. Osteoporosis.

 

FAMILY HISTORY:  Mother  of uterine cancer and her mother also suffered from
breast

cancer. 

 

SOCIAL HISTORY:  This woman is a  and lives at home with her adult 
daughter.  The

patient denies any history of alcohol use.  She was a previous heavy tobacco 
smoker, but

quit many years ago.  She is currently retired. 

 

PAST SURGICAL HISTORY:  

1. Open-heart surgery in 2016 (myomectomy was performed).

2. Bilateral breast implants.

3. Cholecystectomy.

4. Right and left heart catheterization May 2019.

 

PHYSICAL EXAMINATION:

GENERAL:  She is awake, alert, and fluent.  She is mildly dyspneic.  She is very

pleasant, cooperative, and does not appear to be in any acute respiratory 
distress, but

she does look dyspneic. 

VITAL SIGNS:  Height is 5 feet 3 inches, weight 158 pounds, BMI 28.  Blood 
pressure in

the emergency room was 96/70.  The patient states at home her blood pressure is 
80/40.

Blood pressure currently is 120/58, pulse 76, respiratory rate 18, temperature 
97.0, and

oxygen saturation is 95% on 4 L oxygen. 

INTEGUMENT:  Skin is warm and dry.  No pallor, jaundice, or diaphoresis. 

HEENT:  Anterior sclerae.  Moist mucous membranes. 

NECK:  Supple.  No evidence of jugular venous distention. 

CARDIOVASCULAR:  Distant heart sounds.  Regular rate and rhythm.  No S3 gallop. 

LUNGS:  The patient has poor air entry bilaterally with faint wheezing. 

ABDOMEN:  Benign. 

EXTREMITIES:  No edema or deformity.



DIAGNOSES:  

1. Chronic obstructive pulmonary disease exacerbation.

2. Acute on chronic renal failure.

3. Contrast induced nephropathy, likely.

4. Status post right and left heart catheterization two months ago.

5. Acute on chronic diastolic congestive heart failure.

6. Pulmonary hypertension.

 

PLAN:  

1. Gentle intravenous fluids.

2. We will administer one dose of intravenous furosemide.

3. Continue supplemental oxygen.

4. Order nebulized bronchodilators, namely albuterol ipratropium.

5. Consult Cardiology.

6. Order 2D echocardiogram.

7. We will ask therapy to mobilize patient. 

 

I spent 50 minutes in the care of this medically complex patient.

 

 

 

 

______________________________

MD RAE Armando/RINKU

D:  2019 08:45:27

T:  2019 09:50:22

Job #:  [id number]

 

MTDD

## 2019-08-08 NOTE — CONSULTATION
DATE OF CONSULTATION:  

 

Pulmonary Consultation 

 

HISTORY OF PRESENT ILLNESS:  Patient of Dr. Wills.  The patient with a history of

pulmonary hypertension, congestive heart failure, __________, though her pulmonologist

 __________ never mentioned that.  She has been dyspneic for years.  She had recent

bilateral heart catheterization.  She has had a myotomy with possible mitral valve

repair and open surgical procedure.  She has a history of chronically elevated right

hemidiaphragm, presumably __________, possibly related to remote herpes zoster.  She has

a history of requiring home oxygen.  She smoked a few years, claims never to inhale. 

 

FAMILY HISTORY:  Positive for lung cancer.  Born and upbringing in [place].  Worked

for dental office. 

 

PHYSICAL EXAMINATION:

GENERAL:  She is a well-developed sprightly white female, looking her stated age. 

VITAL SIGNS:  Temperature 96.5, pulse 69, respirations 18, and blood pressure

__________. 

HEENT:  Head is normocephalic, atraumatic.  Eyes, extraocular movements intact. 

LUNGS:  Diminished breath sounds at right base. 

HEART:  Regular rhythm. 

ABDOMEN:  Nontender. 

EXTREMITIES:  Nonedematous.

IMPRESSION:  

1. Congestive heart failure, type 2.

2. Pulmonary hypertension related to heart failure with a wedge pressure of 38, PA

pressure is reported to be 60/40. 

 

PLAN:  Therapy for heart failure.  Consider rehabilitation.  Possible pulmonary

rehabilitation.  Request old records from [facility] and [facility].  Echocardiogram and cardiac evaluations are pending. 

 

Thank you for this kind referral.

 

 

 

 

______________________________

MD SANTANA Rizo/RINKU

D:  08/08/2019 10:41:29

T:  08/08/2019 13:43:05

Job #:  [id number]

## 2019-08-09 VITALS — DIASTOLIC BLOOD PRESSURE: 70 MMHG | SYSTOLIC BLOOD PRESSURE: 132 MMHG

## 2019-08-09 VITALS — DIASTOLIC BLOOD PRESSURE: 62 MMHG | SYSTOLIC BLOOD PRESSURE: 128 MMHG

## 2019-08-09 VITALS — SYSTOLIC BLOOD PRESSURE: 128 MMHG | DIASTOLIC BLOOD PRESSURE: 60 MMHG

## 2019-08-09 VITALS — DIASTOLIC BLOOD PRESSURE: 53 MMHG | SYSTOLIC BLOOD PRESSURE: 116 MMHG

## 2019-08-09 VITALS — DIASTOLIC BLOOD PRESSURE: 60 MMHG | SYSTOLIC BLOOD PRESSURE: 128 MMHG

## 2019-08-09 VITALS — SYSTOLIC BLOOD PRESSURE: 131 MMHG | DIASTOLIC BLOOD PRESSURE: 60 MMHG

## 2019-08-09 VITALS — SYSTOLIC BLOOD PRESSURE: 130 MMHG | DIASTOLIC BLOOD PRESSURE: 63 MMHG

## 2019-08-09 LAB
ANION GAP SERPL CALC-SCNC: 12.6 MMOL/L (ref 8–16)
BASOPHILS # BLD AUTO: 0.1 10*3/UL (ref 0–0.1)
BASOPHILS NFR BLD AUTO: 0.7 % (ref 0–1)
BUN SERPL-MCNC: 23 MG/DL (ref 7–26)
BUN/CREAT SERPL: 18 (ref 6–25)
CALCIUM SERPL-MCNC: 9 MG/DL (ref 8.4–10.2)
CHLORIDE SERPL-SCNC: 107 MMOL/L (ref 98–107)
CO2 SERPL-SCNC: 29 MMOL/L (ref 22–29)
DEPRECATED NEUTROPHILS # BLD AUTO: 4.1 10*3/UL (ref 2.1–6.9)
EGFRCR SERPLBLD CKD-EPI 2021: 40 ML/MIN (ref 60–?)
EOSINOPHIL # BLD AUTO: 0.3 10*3/UL (ref 0–0.4)
EOSINOPHIL NFR BLD AUTO: 4.7 % (ref 0–6)
ERYTHROCYTE [DISTWIDTH] IN CORD BLOOD: 13.8 % (ref 11.7–14.4)
GLUCOSE SERPLBLD-MCNC: 93 MG/DL (ref 74–118)
HCT VFR BLD AUTO: 34.3 % (ref 34.2–44.1)
HGB BLD-MCNC: 10.7 G/DL (ref 12–16)
LYMPHOCYTES # BLD: 2.2 10*3/UL (ref 1–3.2)
LYMPHOCYTES NFR BLD AUTO: 29.8 % (ref 18–39.1)
MCH RBC QN AUTO: 29.6 PG (ref 28–32)
MCHC RBC AUTO-ENTMCNC: 31.2 G/DL (ref 31–35)
MCV RBC AUTO: 94.8 FL (ref 81–99)
MONOCYTES # BLD AUTO: 0.7 10*3/UL (ref 0.2–0.8)
MONOCYTES NFR BLD AUTO: 9 % (ref 4.4–11.3)
NEUTS SEG NFR BLD AUTO: 55.5 % (ref 38.7–80)
PLATELET # BLD AUTO: 275 X10E3/UL (ref 140–360)
POTASSIUM SERPL-SCNC: 3.6 MMOL/L (ref 3.5–5.1)
RBC # BLD AUTO: 3.62 X10E6/UL (ref 3.6–5.1)
SODIUM SERPL-SCNC: 145 MMOL/L (ref 136–145)

## 2019-08-09 RX ADMIN — FUROSEMIDE SCH MG: 20 TABLET ORAL at 09:07

## 2019-08-09 RX ADMIN — IPRATROPIUM BROMIDE AND ALBUTEROL SULFATE SCH ML: .5; 2.5 SOLUTION RESPIRATORY (INHALATION) at 15:00

## 2019-08-09 RX ADMIN — IPRATROPIUM BROMIDE AND ALBUTEROL SULFATE SCH ML: .5; 2.5 SOLUTION RESPIRATORY (INHALATION) at 03:00

## 2019-08-09 RX ADMIN — IPRATROPIUM BROMIDE AND ALBUTEROL SULFATE SCH ML: .5; 2.5 SOLUTION RESPIRATORY (INHALATION) at 20:10

## 2019-08-09 RX ADMIN — IPRATROPIUM BROMIDE AND ALBUTEROL SULFATE SCH ML: .5; 2.5 SOLUTION RESPIRATORY (INHALATION) at 23:15

## 2019-08-09 RX ADMIN — IPRATROPIUM BROMIDE AND ALBUTEROL SULFATE SCH ML: .5; 2.5 SOLUTION RESPIRATORY (INHALATION) at 07:25

## 2019-08-09 RX ADMIN — IPRATROPIUM BROMIDE AND ALBUTEROL SULFATE SCH ML: .5; 2.5 SOLUTION RESPIRATORY (INHALATION) at 11:52

## 2019-08-09 NOTE — NUR
PATIENT A/O X3, EVEN RESPIRATIONS ON 4LNC. BOWEL SOUNDS ACTIVE, SKIN INTACT, NO EDEMA. 
TELEMETRY #7 SR WITH BBB. PATIENT AMBULATES WITH WALKER AND STANDBY ASSIST. DAUGHTER AT 
BEDSIDE. NO PAIN OR DISCOMFORT AT THIS TIME. VITAL SIGNS STABLE. CALL LIGHT IN REACH WILL 
CONTINUE TO MONITOR PATIENT.

## 2019-08-09 NOTE — NUR
PATIENT RESTING QUIETLY IN BED, NO ACUTE DISTRESS OBSERVED. BED ALARM ON, CALL LIGHT WITHIN 
EASY REACH AND SHE'S INSTRUCTED TO CALL FOR ASSISTANCE AS NEEDED.

## 2019-08-09 NOTE — PROGRESS NOTE
DATE:  08/09/2019

 

Cardiology Progress Note 

 

SUBJECTIVE:  No major events overnight.  Feels much better.

 

OBJECTIVE:  VITAL SIGNS:  Temperature afebrile, pulse 80, respiratory rate 22, blood

pressure 132/70, saturating 97% on nasal cannula. 

GENERAL:  Elderly white female, in no acute distress. 

CARDIOVASCULAR:  Regular rate and rhythm.  No murmurs, rubs, or gallops. 

LUNGS:  Clear to auscultation bilaterally. 

ABDOMEN:  Soft, nontender, and nondistended. 

NEURO AND PSYCH:  Alert and oriented to person, place, and time.  Normal affect.

 

INPATIENT MEDICATIONS:  Reviewed.

 

LABORATORY DATA:  Reviewed.

 

TELEMETRY DATA:  Reviewed.

 

ASSESSMENT:  

1. Acute kidney injury on chronic kidney disease, now resolved.

2. Chronic diastolic heart failure.

3. Hypertension.

4. Hyperlipidemia.

5. Pulmonary hypertension.

 

PLAN:  The patient is feeling much better.  Renal function has returned to her baseline.

 The patient is okay to be discharged from cardiovascular standpoint on current

cardiovascular regimen.  Follow up in clinic one weeks post discharge. 

 

Thank you for this consult.  We will continue to follow.

 

 

 

 

______________________________

MD AMIE Pham/RINKU

D:  08/09/2019 16:20:37

T:  08/09/2019 16:40:27

Job #:  [id number]

## 2019-08-09 NOTE — NUR
LEFT AC IV NOTED TO BE HANGING OUT. IV SITE NOT BLEEDING. REMOVED PATIENTS IV, CATHETER TIP 
INTACT ON REMOVAL AND PRESSURE DRESSING APPLIED.

## 2019-08-09 NOTE — NUR
ORDERS FOR DURAN REC'D

CHOICE LETTER SIGNED BY PT FOR [facility] [employer]

 [phone]

FAX: [phone]

FAXED ORDERS

CONFIRMATION REC'D

REQUEST THAT WALKER BED DELIVERED TO PT'S ROOM ASAP AS PT HAS PLANNED DC HOME IN AM

COPY OF CHOICE LETTER GIVEN TO PT

## 2019-08-09 NOTE — CONSULTATION
DATE OF CONSULTATION:  08/08/2019

 

Cardiology Consult Note 

 

REASON FOR CONSULT:  Syncope.

 

CHIEF COMPLAINT:  Syncope, shortness of breath, and weakness.

 

HISTORY OF PRESENT ILLNESS:  The patient is an 83-year-old female with history of

chronic diastolic heart failure, pulmonary hypertension, chronic kidney disease, who

recently underwent right and left cardiac catheterization at [facility] in May.

She was started on diuretics and now presents with dizziness, lightheadedness, and

syncope when she tried to get up and walk with a walker.  Denies any chest pain or

palpitations preceding, with which she got up to use her walker and got lightheaded and

lost pulse strength and collapsed.  She said this has happened to her in the past

because of low blood pressure; however, previously she was able to crawl her way to a

couch or bed without losing consciousness.  Of note, the patient had recent coronary

angiography performed, which showed no significant coronary artery disease.  Right heart

catheterization showed pulmonary capillary wedge pressure of 33 with PA pressures of

60/40. 

 

REVIEW OF SYSTEMS:

As per HPI, otherwise negative.

 

PAST MEDICAL HISTORY:  

1. Chronic diastolic heart failure.

2. Hypertension.

3. Hyperlipidemia.

4. Chronic kidney disease.

5. Pulmonary hypertension.

 

SOCIAL HISTORY:  The patient does not smoke, drink, or abuse drugs.

 

FAMILY HISTORY:  Noncontributory.

 

OUTPATIENT MEDICATIONS:  Reviewed and documented in the medical record.

 

ALLERGIES:  AS DOCUMENTED IN THE MEDICAL RECORD.

 

OBJECTIVE:  VITAL SIGNS:  Temperature afebrile, pulse 80, respiratory rate 20, blood

pressure 146/82, and saturating 95% on nasal cannula. 

GENERAL:  Elderly white female, in no acute distress. 

CARDIOVASCULAR:  Regular rate and rhythm.  No murmurs, rubs, or gallops. 

LUNGS:  Clear to auscultation bilaterally. 

ABDOMEN:  Soft, nontender, and nondistended. 

NEURO AND PSYCH:  Alert and oriented to person, place, and time.  Normal affect.

 

INPATIENT MEDICATIONS:  Reviewed.

 

TELEMETRY DATA:  Reviewed shows normal sinus rhythm.

 

IMAGING DATA:  Reviewed.

 

LABORATORY DATA:  Reviewed.

 

ASSESSMENT AND PLAN:  

1. Acute kidney injury on chronic kidney disease.

2. Chronic diastolic heart failure.

3. Hypertension.

4. Hyperlipidemia.

5. Pulmonary hypertension.

 

PLAN:  The patient was likely dehydrated due to over-diuresis, given orthostatic

hypotension and PETRONA with elevated BUN.  Creatinine is now improving.  Echocardiogram was

reviewed, shows normal LV ejection fraction with impaired relaxation.  No significant

valvular abnormalities.  Discussed her condition with the patient and her daughter and

warned her regarding salt intake.  Long-term, she will need low-dose diuretic once her

renal function normalizes.  She will need a close followup in my office one week post

discharge. 

 

Thank you for this consult.  We will continue to follow.

 

 

 

 

______________________________

MD AMIE Pham/RINKU

D:  08/08/2019 21:57:13

T:  08/09/2019 00:24:44

Job #:  [id number]

## 2019-08-09 NOTE — DIAGNOSTIC IMAGING REPORT
EXAMINATION:  CHEST SINGLE (PORTABLE)    



INDICATION: Shortness of breath



COMPARISON: Chest radiograph of 8/7/2019

     

FINDINGS:



LINES/TUBES:Sternotomy wires intact.



LUNGS:The lungs are moderately inflated. Mild patchy opacity at the left lung

base. Unchanged elevation of the right hemidiaphragm.



PLEURA:Trace right pleural effusion. No pneumothorax.



MEDIASTINUM:The cardiomediastinal silhouette appears unchanged in size and

shape. Atherosclerotic calcifications of the thoracic aorta.



BONES/SOFT TISSUES:No acute osseous injury.



ABDOMEN:No free air under the diaphragm.





IMPRESSION: 

Mild patchy opacity at the left lung base, most likely subsegmental

atelectasis.



Trace right pleural effusion.



Unchanged right hemidiaphragmatic elevation.



Signed by: Christoph Hidalgo MD on 8/9/2019 12:29 PM

## 2019-08-09 NOTE — NUR
RECEIVED PATIENT AWAKE RESTING IN BED NO SIGNS OF DISTRESS. BED LOW, WHEELS LOCKED, SIDE 
RAILS X2, CALL LIGHT IN REACH WILL CONTINUE TO MONITOR PATIENT.

## 2019-08-10 VITALS — DIASTOLIC BLOOD PRESSURE: 72 MMHG | SYSTOLIC BLOOD PRESSURE: 154 MMHG

## 2019-08-10 VITALS — SYSTOLIC BLOOD PRESSURE: 154 MMHG | DIASTOLIC BLOOD PRESSURE: 72 MMHG

## 2019-08-10 VITALS — DIASTOLIC BLOOD PRESSURE: 59 MMHG | SYSTOLIC BLOOD PRESSURE: 130 MMHG

## 2019-08-10 VITALS — DIASTOLIC BLOOD PRESSURE: 62 MMHG | SYSTOLIC BLOOD PRESSURE: 123 MMHG

## 2019-08-10 RX ADMIN — FUROSEMIDE SCH MG: 20 TABLET ORAL at 08:31

## 2019-08-10 RX ADMIN — IPRATROPIUM BROMIDE AND ALBUTEROL SULFATE SCH ML: .5; 2.5 SOLUTION RESPIRATORY (INHALATION) at 03:00

## 2019-08-10 RX ADMIN — IPRATROPIUM BROMIDE AND ALBUTEROL SULFATE SCH ML: .5; 2.5 SOLUTION RESPIRATORY (INHALATION) at 07:35

## 2019-08-10 NOTE — NUR
PT DISCHARGED HOME TODAY

WALKER NOT YET DELIVERED FROM [facility]

CALLED AND SPOKE WITH DANNY AT [facility] WHO STATES THEY WILL DELIVER TO HOME ON MONDAY

PT NOTIFIED AND STATES SHE CAN USE ONE OF HER DTRS WALKERS AS SHE HAS 2 OF THEM

PT HAS NAME AND NUMBER OF [facility]

## 2019-08-10 NOTE — NUR
PATIENT ASLEEP, SHE'S EASY TO AROUSE. NO RESPIRATORY DISTRESS OBSERVED, SHE DENIES PAIN. 
CALL LIGHT WITHIN EASY REACH, WILL CONTINUE TO MONITOR.

## 2019-08-10 NOTE — NUR
RECEIVED PATIENT AWAKE RESTING IN BED NO SIGNS OF DISTRESS. BED LOW, WHEELS LOCKED, SIDE 
RAILS X2. CALL LIGHT IN REACH WILL CONTINUE TO MONITOR PATIENT.

## 2019-08-10 NOTE — NUR
PATIENT DISCHARGED FROM FACILITY. PATIENT GATHERED ALL PERSONAL BELONGINGS, DISCHARGE 
INSTRUCTIONS, AND FOLLOW UP INFORMATION. LEFT UNIT IN WHEELCHAIR AND WENT HOME VIA PRIVATE 
AUTO. NO SIGNS OF DISTRESS WHEN LEAVING FACILITY.

## 2019-08-10 NOTE — NUR
ROUNDS MADE, PATIENT ASSISTED WITH ADLS. NO RESPIRATORY DISTRESS OBSERVED, SHE DENIES PAIN. 
BED ALARM ON, CALL LIGHT WITHIN EASY REACH AND SHE'S INSTRUCTED TO CALL FOR ASSISTANCE AS 
NEEDED.

## 2019-08-10 NOTE — NUR
PATIENT A/O X3, EVEN RESPIRATIONS ON 4LNC. VITAL SIGNS STABLE , SKIN INTACT, NO EDEMA. NO 
DISCOMFORT OR PAIN AT THIS TIME. PATIENT AMBULATES WITH WALKER AND STANDBY ASSISTANCE. CALL 
LIGHT IN REACH WILL CONTINUE TO MONITOR PATIENT.

## 2019-08-10 NOTE — DISCHARGE SUMMARY
ADMIT DIAGNOSES:  

1. Chronic obstructive pulmonary disease exacerbation.

2. Acute on chronic renal failure.

3. Contrast-induced nephropathy, likely.

4. Status post right and left heart catheterization two months ago.

5. Acute on chronic diastolic congestive heart failure.

6. Pulmonary hypertension.

7. Frequent falls.

 

DISCHARGE DIAGNOSES:  Revealed:

1. Chronic obstructive pulmonary disease exacerbation, resolved.

2. Chronic obstructive pulmonary disease.

3. Acute on chronic diastolic congestive heart failure, resolved.

4. Chronic diastolic congestive heart failure.

5. Acute on chronic renal insufficiency, resolved.

6. Stage 3 chronic kidney disease.

7. Pulmonary hypertension.

8. Hypertensive heart disease.

 

HOSPITAL COURSE:  This is an 83-year-old white woman, who was admitted to [facility] with diagnosis of COPD exacerbation and acute on chronic renal

insufficiency.  The patient's acute renal insufficiency resolved with intravenous

fluids.  The patient was also diagnosed with acute on chronic diastolic congestive heart

failure during this hospitalization corroborated by an echocardiogram, which revealed a

preserved left ventricular ejection fraction 65% to 70%, but did reveal impaired LV

relaxation consistent with diastolic dysfunction.  The patient was seen by Dr. Kamaljit Lara during this hospitalization because of her acute on chronic diastolic congestive

heart failure.  The patient was seen by Dr. Gold Caba because of her COPD

exacerbation.  The patient was continued on supplemental oxygen 4 liters per minute

during this hospitalization because of her chronic oxygen dependence and her severe

COPD.  Her hospitalization was unremarkable.  On admission, her BUN and creatinine were

39 and 2.5 respectively and one day prior to discharge, they were 23 and 1.27

respectively.  The patient also underwent serial cardiac enzymes during this

hospitalization, which did not reveal any evidence of acute myocardial ischemia or

infarction.  Her TSH during this hospitalization was 2.774.  The patient was also

admitted because she was experiencing frequent falls.  It was concluded that her

frequent falls were most likely secondary to polypharmacy as well as hypotension.  The

patient was told that she is on multiple medications that can cause excessive sedation

and lead to falls such as nortriptyline, doxylamine, tramadol, and lorazepam.  These

medications were held until further notice. 

 

CONDITION ON DISCHARGE:  Stable.

 

DISCHARGE MEDICATIONS:  

1. Aspirin 81 mg daily.

2. Metoprolol succinate 25 mg daily.

3. Furosemide 20 mg daily.

4. Albuterol/ipratropium nebulized treatments every 4 hours as needed for shortness of

breath and wheezing. 

5. Lisinopril 10 mg every night.

6. Sertraline 50 mg daily.

7. Melatonin 10 mg at bedtime p.r.n. insomnia. 

So, the following medications were discontinued until further notice:

1. Simvastatin.

2. Nortriptyline.

3. Doxylamine.

4. Tramadol.

5. Lorazepam.

 

FOLLOWUP INSTRUCTIONS:  The patient instructed to follow up with her primary care

physician namely myself, Dr. Jignesh Wills within two weeks.  The patient instructed to

follow up with her pulmonologist, Dr. Gold Caba within three weeks and then she will

follow up with Cardiology, Dr. Lara also within three weeks. 

 

 

 

 

______________________________

MD RAE Armando/RINKU

D:  08/10/2019 14:16:37

T:  08/10/2019 15:11:16

Job #:  [id number]

 

cc:            MD Gold Pham MD

## 2019-09-06 ENCOUNTER — HOSPITAL ENCOUNTER (EMERGENCY)
Dept: HOSPITAL 88 - FSED | Age: 84
Discharge: HOME | End: 2019-09-06
Payer: MEDICARE

## 2019-09-06 VITALS — DIASTOLIC BLOOD PRESSURE: 54 MMHG | SYSTOLIC BLOOD PRESSURE: 100 MMHG

## 2019-09-06 VITALS — BODY MASS INDEX: 26.22 KG/M2 | WEIGHT: 148 LBS | HEIGHT: 63 IN

## 2019-09-06 DIAGNOSIS — R10.12: ICD-10-CM

## 2019-09-06 DIAGNOSIS — I25.2: ICD-10-CM

## 2019-09-06 DIAGNOSIS — F41.9: ICD-10-CM

## 2019-09-06 DIAGNOSIS — R06.00: ICD-10-CM

## 2019-09-06 DIAGNOSIS — F32.9: ICD-10-CM

## 2019-09-06 DIAGNOSIS — I50.9: ICD-10-CM

## 2019-09-06 DIAGNOSIS — R07.1: Primary | ICD-10-CM

## 2019-09-06 DIAGNOSIS — I10: ICD-10-CM

## 2019-09-06 DIAGNOSIS — Z99.81: ICD-10-CM

## 2019-09-06 PROCEDURE — 99283 EMERGENCY DEPT VISIT LOW MDM: CPT

## 2019-09-06 PROCEDURE — 71101 X-RAY EXAM UNILAT RIBS/CHEST: CPT

## 2019-09-06 NOTE — XMS REPORT
Continuity of Care Document

                             Created on: 2019



JARRETT BEY

External Reference #: [id number]

: 1935

Sex: Female



Demographics







                          Address                   [address] [place] [address]  [id number]

 

                          Home Phone                [phone]

 

                          Preferred Language        English

 

                          Marital Status            Unknown

 

                          Faith Affiliation     Unknown

 

                          Race                      Unknown

 

                          Ethnic Group              Unknown





Author







                          Author                    [employer]

 

                          Address                   Unknown

 

                          Phone                     Unavailable







Care Team Providers







                    Care Team Member Name    Role                Phone

 

                    [employer]    Unavailable         Unavailable



                                    



Problems

                    





                    Problem                            Status                            Onset Date     

                          Classification                            Date Reported       

                          Comments                            Source                    

 

                          Right bundle branch block                            Active                       

                                                Problem                            2016         

                                                      Rakesh Zaman MD, PA                  

  

 

                    Aortic valve disorders                            Active                            

                            Problem                            2016            

                                                      Rakesh Zaman MD, PA                    

 

                          Chronic diastolic heart failure                            Active                 

                                                Problem                            2016   

                                                      Rakesh Zaman MD, PA            

        

 

                          Chronic diastolic heart failure                            Active                 

                                                Problem                            2016   

                                                      Rakesh Zaman MD, PA            

        

 

                          Unspecified right bundle-branch block                            Active           

                                                Problem                            2016

                                                        Rakesh Zaman MD, PA         

           

 

                          Obstructive hypertrophic cardiomyopathy                            Active         

                                                Problem                            2016

                                                        Rakesh Zaman MD, PA         

           

 

                          Orthostatic hypotension                            Active                         

                                                Problem                            2016           

                                                      Rakesh Zaman MD, PA                    



 

                    Angina pectoris                            Active                                   

                          Problem                            2016                   

                                                      Rakesh Zaman MD, PA                    

 

                          Orthostatic hypotension                            Active                         

                                                Problem                            2016           

                                                      Rakesh Zaman MD, PA                    



 

                          Chronic obstructive pulmonary disease, unspecified                            Active

                                                        Problem                      

                    2016                                                        Rakesh Zaman MD,

 [place]                    

 

                          Acute renal insufficiency                            Active                       

                                                Problem                            08/10/2019         

                                                      [facility]

                    

 

                    Syncope and collapse                            Active                              

                          Problem                            08/10/2019              

                                                      [facility]    

                

 

                    Transient hypotension                            Active                             

                           Problem                            08/10/2019             

                                                      [facility]   

                 



                                                                                
                                                                                
                                                                                
                     



Medications

                    





                    Medication                            Details                            Route      

                          Status                            Patient Instructions         

                          Ordering Provider                            Order Date           

                                        Source                    

 

                          Albuterol Sulfate 0.63 Mg/3 Ml Vial.neb,                                          

                                                Active                                       

                                                08/10/2019                            [facility]                    

 

                          Lisinopril 10 Mg Tablet, 10 Mg Oral                            Twice A Day        

                                                Active                                 

                                                      08/10/2019                        

                                        [facility]                    

 

                          Aspirin 81 Mg Tab.chew                            Bedtime                         

                                                Active                                                  

                                                                            [facility]                    

 

                          Bumetanide 1 Mg Tablet                            Twice A Day                     

                                                Active                                              

                                                                            [facility]                    

 

                          Diphenhydramine Hcl (Benadryl) 25 Mg Capsule                            As Needed 

as needed for Allergy                                                        Active  

                                                                                       

                                        [facility]                    

 

                          Furosemide (Lasix) 20 Mg Tablet                            Every Morning          

                                                Active                                   

                                                                             [facility]                    

 

                                        Ipratropium/Albuterol Sulfate (Iprat-Albut 0.5-3(2.5) Mg/3 Ml) 3 Ml Ampul.neb   

                                        Four Times Daily as needed for Wheezing                   

                                                Active                                            

                                                                            [facility]                    

 

                          Lisinopril 10 Mg Tablet                            Bedtime                        

                                                Active                                                 

                                                                            [facility]                    

 

                          Lorazepam 1 Mg Tablet                            Twice A Day                      

                                                Active                                               

                                                                            [facility]                    

 

                    Melatonin 3 Mg Tablet                            Bedtime                            

                            Active                                                   

                                                                            [facility]                    

 

                          Metoprolol Succinate 25 Mg Tab.er.24h                            Daily            

                                                Active                                     

                                                                            [facility]                    

 

                          Metoprolol Tartrate 25 Mg Tablet                            Twice A Day           

                                                Active                                    

                                                                            [facility]                    

 

                          Mirtazapine 15 Mg Tab                            Today At 1:00PM                  

                                                Active                                           

                                                                            [facility]                    

 

                          Nortriptyline Hcl 25 Mg Capsule                            Twice A Day            

                                                Active                                     

                                                                            [facility]                    

 

                          Sertraline Hcl 50 Mg Tablet                            Daily                      

                                                Active                                               

                                                                            [facility]                    

 

                          Simvastatin 40 Mg Tablet                            Today At 9:00PM               

                                                Active                                        

                                                                            [facility]                    



                                                                                
                                                                                
                                                                                
                                                                                




Allergies, Adverse Reactions, Alerts

        





                                        No Known Medication Allergies                      



                                                        



Immunizations

        





                                        No Data Provided for This Section



                                    



Results







                    Order Name                            Results                            Value      

                          Reference Range                            Date                

                          Interpretation                            Comments                       

                                        Source                    

 

                                                Blood leukocytes automated count (number/volume)    7.31

                            4.8 - 10.8                            2019         

                                                                            [facility]                    

 

                                                Blood erythrocytes automated count (number/volume)    3.62

                            3.6 - 5.1                            2019          

                                                                            [facility]                    

 

                                                Blood hemoglobin measurement (moles/volume)    10.7     

                          12.0 - 16.0                            2019             

                                                                            [facility]                    

 

                                                Automated blood hematocrit (volume fraction)    34.3    

                          34.2 - 44.1                            2019            

                                                                            [facility]                    

 

                                                Automated erythrocyte mean corpuscular volume    94.8   

                          81 - 99                            2019               

                                                                            [facility]                    

 

                                                      Automated erythrocyte mean corpuscular hemoglobin (mass

 per erythrocyte)         29.6                            28 - 32                         

                    2019                                                                          

                                        [facility]                    

 

                                                      Automated erythrocyte mean corpuscular hemoglobin concentration

 measurement (mass/volume)    31.2                            31 - 35                

                    2019                                                                 

                                        [facility]                    

 

                                            RDW BldCo-Rto    13.8                            11.7 - 14.4

                            2019                                               

                                                      [facility]         

           

 

                                                Automated blood platelet count (count/volume)    275    

                          140 - 360                            2019              

                                                                            [facility]                    

 

                                                      Automated blood segmented neutrophil count as percentage

 of total leukocytes      55.5                            38.7 - 80.0                  

                    2019                                                                   

                                        [facility]                    

 

                                                      Automated blood lymphocyte count as percentage ot total

 leukocytes         29.8                            18.0 - 39.1                            2019

                                                                                    [facility]                    

 

                                                      Automated blood monocyte count as percentage of total 

leukocytes          9.0                            4.4 - 11.3                            2019

                                                                                    [facility]                    

 

                                                      Automated blood eosinophil count as percentage of total

 leukocytes         4.7                            0.0 - 6.0                            2019

                                                                                    [facility]                    

 

                                                      Automated blood basophil count as percentage of total 

leukocytes          0.7                            0.0 - 1.0                            2019

                                                                                    [facility]                    

 

                                            IM GRANULOCYTES %    0.3                            0.0 - 

1.0                            2019                                            

                                                      [facility]      

              

 

                                                Automated blood neutrophil count    4.1                 

                    2.1 - 6.9                            2019                             

                                                       [facility]                    

 

                                                Blood lymphocytes count (number/volume)    2.2          

                          1.0 - 3.2                            2019                    

                                                                            [facility]                    

 

                                                Blood monocytes automated count (number/volume)    0.7  

                          0.2 - 0.8                            2019            

                                                                            [facility]                    

 

                                                Automated blood eosinophil count    0.3                 

                    0.0 - 0.4                            2019                             

                                                       [facility]                    

 

                                                Automated blood basophil count (count/volume)    0.1    

                          0.0 - 0.1                            2019              

                                                                            [facility]                    

 

                                                Absolute Immature Granulocyte (auto    0.02             

                          0 - 0.1                            2019                         

                                                                            [facility]                    

 

                                                Serum or plasma sodium measurement (moles/volume)    145

                            136 - 145                            2019          

                                                                            [facility]                    

 

                                                Serum or plasma potassium measurement (moles/volume)    

3.6                            3.5 - 5.1                            2019       

                                                                             [facility]                    

 

                                                Serum or plasma chloride measurement (moles/volume)    107

                            98 - 107                            2019           

                                                                            [facility]                    

 

                                                      Serum or plasma carbon dioxide, total measurement (moles/volume)

                    29                            22 - 29                            2019        

                                                                            [facility]                    

 

                                                Serum or plasma anion gap    12.6                       

                    8 - 16                            2019                                      

                                                      [facility]

                    

 

                                                      Serum or plasma urea nitrogen measurement (mass/volume)

                    23                            7 - 26                            2019         

                                                                            [facility]                    

 

                                                Serum or plasma creatinine measurement (mass/volume)    

1.27                            0.57 - 1.11                            2019    

                                                                                [facility]                    

 

                                                Serum or plasma urea nitrogen/creatinine mass ratio    18

                            6 - 25                            2019             

                                                                            [facility]                    

 

                                                      Estimated glomerular filtration rate (GFR) determination

                    40                            60                            2019             

                                                                            [facility]                    

 

                                            Glucose measurement    93                            74 - 

118                            2019                                            

                                                      [facility]      

              

 

                                                Serum or plasma calcium measurement (mass/volume)    9.0

                            8.4 - 10.2                            2019         

                                                                            [facility]                    

 

                                                      Serum or plasma creatine kinase measurement (enzymatic

 activity/volume)         104                            29 - 168                         

                    2019                                                                          

                                        [facility]                    

 

                                                      Serum or plasma creatine kinase MB measurement (mass/volume)

                    5.10                            0 - 5.0                            2019      

                                                                              [facility]                    

 

                                                      Troponin I measurement by highly sensitive enzyme immunoassay

                    0.029                            0 - 0.300                            2019   

                                                                                 [facility]                    

 

                                                      Serum or plasma total bilirubin measurement (mass/volume)

                    0.2                            0.2 - 1.2                            2019     

                                                                               [facility]                    

 

                                                Aspartate Amino Transf (AST/SGOT)    16                 

                    5 - 34                            2019                                

                                                      [facility]                    

 

                                                      Serum or plasma alanine aminotransferase measurement (enzymatic

 activity/volume)    10                            0 - 55                            2019

                                                                                    [facility]                    

 

                                                Serum or plasma protein measurement (mass/volume)    5.7

                            6.5 - 8.1                            2019          

                                                                            [facility]                    

 

                                                Serum or plasma albumin measurement (mass/volume)    3.1

                            3.5 - 5.0                            2019          

                                                                            [facility]                    

 

                                                Plasma globulin measurement (mass/volume)    2.6        

                          2.3 - 3.5                            2019                  

                                                                            [facility]                    

 

                                                Serum or plasma albumin/globulin mass ratio    1.2      

                          0.8 - 2.0                            2019                

                                                                            [facility]                    

 

                                                      Serum or plasma alkaline phosphatase measurement (enzymatic

 activity/volume)    34                            40 - 150                            

2019                                                                           

                                        [facility]                    

 

                                                      Serum or plasma triglyceride measurement (mass/volume)

                    186                            0 - 149                            2019       

                                                                             [facility]                    

 

                                                      Serum or plasma cholesterol measurement (mass/volume) 

                    149                            0 - 199                            2019        

                                                                            [facility]                    

 

                                                      Serum or plasma cholesterol in LDL measurement (mass/volume)

                    70                            60 - 130                            2019       

                                                                             [facility]                    

 

                                                      Serum or plasma cholesterol in HDL measurement (mass/volume)

                    42                            40 - 60                            2019        

                                                                            [facility]                    

 

                                                      Serum or plasma total cholesterol/cholesterol in HDL mass

 ratio              3.5                            3.0 - 3.6                            2019

                                                                                    [facility]                    

 

                                            Lactic Acid Level    7.6                            4.5 - 

19.8                            2019                                           

                                                      [facility]     

               

 

                                                Blood culture       NO GROWTH AFTER 48 HOURS               

                                                2019                                    

                                                      [facility]

                    

 

                                                Urine color determination    YELLOW                     

                    YELLOW                            2019                                    

                                                      [facility]

                    

 

                                            Urine clarity    SL CLOUDY                            CLEAR

                            2019                                               

                                                      [facility]         

           

 

                                                Specific gravity of Urine by Test strip    1.015        

                          1.010 - 1.025                            2019              

                                                                            [facility]                    

 

                                                Urine pH measurement by automated test strip    6       

                          5 - 7                            2019                     

                                                                            [facility]                    

 

                                                      Urine leukocyte esterase detection by automated test strip

                    NEGATIVE                            NEGATIVE                            2019 

                                                                                   [facility]                    

 

                                                Urine nitrite detection by automated test strip    NEGATIVE

                            NEGATIVE                            2019           

                                                                            [facility]                    

 

                                                Urine protein detection by automated test strip    NEGATIVE

                            NEGATIVE                            2019           

                                                                            [facility]                    

 

                                                Urine glucose detection by automated test strip    NEGATIVE

                            NEGATIVE                            2019           

                                                                            [facility]                    

 

                                                Urine ketones detection by automated test strip    NEGATIVE

                            NEGATIVE                            2019           

                                                                            [facility]                    

 

                                                      Urine urobilinogen measurement by test strip (mass/volume)

                    0.2                            0.2 - 1                            2019       

                                                                             [facility]                    

 

                                                Urine total bilirubin detection    NEGATIVE             

                          NEGATIVE                            2019                        

                                                                            [facility]                    

 

                                                Urine erythrocytes detection    NEGATIVE                

                    NEGATIVE                            2019                             

                                                       [facility]                    

 

                                                      Automated urine sediment leukocyte count by microscopy

 (number/high power field)    NONE                            0 - 5                  

                    2019                                                                   

                                        [facility]                    

 

                                                      Erythrocytes detection in urine sediment by light microscopy

                    NONE                            0 - 5                            2019        

                                                                            [facility]                    

 

                                                      Bacteria detection in urine sediment by light microscopy

                    NONE                            NONE                            2019         

                                                                            [facility]                    

 

                                                      Epithelial cells detection in urine sediment by light 

microscopy          RARE                            NONE                            2019

                                                                                    [facility]                    

 

                                                      Hyaline casts detection in urine sediment by light microscopy

                    6-10                            0 - 1                            2019        

                                                                            [facility]                    

 

                                                      Coarse granular casts detection in urine sediment by light

 microscopy         1-5                            0                            2019  

                                                                                  [facility]                    

 

                                                      Prothrombin time (PT) in platelet poor plasma by coagulation

 assay              12.7                            11.9 - 14.5                            2019

                                                                                    [facility]                    

 

                                                INR in Platelet poor plasma by Coagulation assay    0.91

                                                        2019                   

                                                                            [facility]                    

 

                                                      Activated partial thromboplastin time (aPTT) in platelet

 poor plasma bycoagulation assay    25.5                            23.8 - 35.5      

                          2019                                                     

                                                      [facility]               

     

 

                                            BNP Bld-mCnc    108.7                            0 - 100  

                          2019                                                 

                                                      [facility]           

         

 

                                                      Serum or plasma thyrotropin measurement by detection limit

 <=0.005 miu/l (units/volume)    2.774                            0.350 - 4.940      

                          2019                                                     

                                                      [facility]               

     



                                                                                
                                                                                
                                                                                
                                                                                
                                                                                
                                                                                
                                                                                
                                                                                
                     



Pathology Reports







                                        No Data Provided for This Section                    



                            



Diagnostic Reports

            





                                        No Data Provided for This Section                    



                                                            



Consultation Notes

                    





                                        No Data Provided for This Section                    



                                                            



Discharge Summaries

                    





                                        No Data Provided for This Section                    



                                                            



History and Physicals

                    





                                        No Data Provided for This Section                    



                                                                



Vital Signs

                         





                                        No Data Provided for This Section



                                                                 



Encounters

                    





                    Location                            Location Details                            Encounter

 Type                            Encounter Number                            Reason For

 Visit                            Attending Provider                            ADM Date

                            DC Date                            Status                

                                        Source                    

 

                    Rakesh Zaman MD, PA                                                        Follow-Up

                                        [id number]                   

                                                                              10/07/2015          

                    10/07/2015                                                        Rakesh Zaman MD, PA                    

 

                    Rakesh Zaman MD, PA                                                        reschedule

 appt                                   [id number]              

                                                                              11/10/2016     

                          11/10/2016                                                    

                                        Rakesh Zaman MD, PA                    

 

                                                                            Discharged Inpatient        

                          [id number]                                                     

                    KATARINA ADAMS MD                            2019                            08/10/2019

                                                        [facility]                    



                                                                                
               



Procedures

                    





                    Procedure                            Code                            Date           

                          Perfomer                            Comments                        

                                        Source                    

 

                                        Computed tomography of brain without radiopaque contrast                        

                    [id number]                            2019                            [facility]                    



                                                            



Assessment and Plan

                    





                                        No Data Provided for This Section                    



                                    



Plan of Care







                    Plan of Care        Date                Source

 

                                           Discharge Date 08/10/19 11:02am

 

  Disposition HOME, SELF-CARE

 

  Instructions/Education Provided [facility] 

Congestive Heart Failure 

Fall Prevention 

 

  Prescriptions See Medication Section

 

  Referrals KATARINA ADAMS MD (Internal Medicine) 

Order Date: 1-2 Weeks 

Entered Date: 08/10/2019 9:50am 

Address: 

                                        [address] 

[place] [address] 

                                        [phone] 

 

 

RM BRIDGES MD (Pulmonary) 

Order Date: 1-2 Weeks 

Entered Date: 08/10/2019 9:50am 

Address: 

                                        [address] [place] [address] 

                                        [phone] 

 

 

MAAME BRADSHAW MD (Cardiology) 

Order Date: 1-2 Weeks 

Entered Date: 08/10/2019 9:50am 

Address: 

                                        [address] 

[place] [address] 

                                        [phone] 

 

  Additional Instructions/Education F/U WITH DR. ADAMS IN 2 WEEKS 

F/U WITH DR. MARICEL BRADSHAW  

F/U WITH DR. BRUSH  

ACTIVITY AS TOLERATED 

MONITOR FLUID INTAKE AND SALT INTAKE 

ONLY 6, 8 OZ GLASSES OF LIQUIDS A DAY  

 

                              08/10/2019                            [facility]                    



                                                                        



Social History

                    





                    Social History                            Date                            Source    

                

 

                                           Smoking Status Start Date Stop Date 

    Former smoker

   

                              08/10/2019                            [facility]                    

 

                                        Social History ElementQualifiersDate Reported

Tobacco Use:

                                        .  Are you a: former smoker quit around 1990

Oct 07, 2015

Marital Status:

                                        .   as of 2013.

Oct 07, 2015

Do you drink alcohol?

                                        .  Status: No

Oct 07, 2015

                            10/07/2015                            Rakesh Zaman MD, PA

                    



                                                                                
   



Family History

                    





                                        No Data Provided for This Section                    



                                                                



Advance Directives

                    





                    Order Name                            Results                            Value      

                          Date                            Source                    

 

                          Advance Directives                            Advance Directives                  

                                           Directive Response Recorded Date/Time 

    Does the patient have an advance directive?

 Yes

 19 10:43pm

 

  If yes, is advance directive on file with [facility]?

 No

 19 2:48pm

 

  If not on file with [facility] will patient provide a copy?

 No

 19 2:48pm

 

  Do you have a Directive to Physician?

 No

 19 6:11pm

 

  Do you have a Medical Power of ?

 No

 19 6:11pm

 

  Do you have an out of hospital Do Not Resuscitate Order?

 No

 19 6:11pm

 

  Do you have any special needs we should be aware of?

 No

 19 6:11pm

 

  Do you have a support person here with you today?

 Yes

 19 6:11pm

 

  Did patient receive Notice of Privacy Practices?

 Yes

 19 6:11pm

 

  Did patient receive patient rights and responsibilities?

 Yes

 19 6:11pm

 

                              08/10/2019                            [facility]                    



                                                                    



Functional Status

                    





                                        No Data Provided for This Section

## 2019-09-06 NOTE — DIAGNOSTIC IMAGING REPORT
EXAMINATION:  RIBS UNILAT W/CXR- HOPD    



INDICATION: Left rib pain, trauma



COMPARISON: Chest radiograph 8/9/2019

     

FINDINGS:



LINES/TUBES:None



LUNGS:No focal consolidation or pulmonary edema. Unchanged right

hemidiaphragmatic elevation.



PLEURA:No pleural effusion or pneumothorax.



MEDIASTINUM:The cardiomediastinal silhouette appears unchanged in size and

shape.



BONES/SOFT TISSUES:No displaced rib fractures.



ABDOMEN:No free air under the diaphragm.





IMPRESSION: 

No displaced rib fractures.



No focal pneumonia or pulmonary edema.



Unchanged right hemidiaphragmatic elevation.



Signed by: Christoph Hidalgo MD on 9/6/2019 10:45 AM

## 2019-10-29 ENCOUNTER — HOSPITAL ENCOUNTER (EMERGENCY)
Dept: HOSPITAL 88 - FSED | Age: 84
Discharge: HOME | End: 2019-10-29
Payer: MEDICARE

## 2019-10-29 VITALS — WEIGHT: 148 LBS | HEIGHT: 63 IN | BODY MASS INDEX: 26.22 KG/M2

## 2019-10-29 DIAGNOSIS — J44.9: ICD-10-CM
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
## 2019-10-29 DIAGNOSIS — Z99.81: ICD-10-CM

## 2019-10-29 DIAGNOSIS — F41.9: ICD-10-CM
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
## 2019-10-29 DIAGNOSIS — I25.2: ICD-10-CM

## 2019-10-29 DIAGNOSIS — S80.02XA: Primary | ICD-10-CM

## 2019-10-29 DIAGNOSIS — I50.9: ICD-10-CM

## 2019-10-29 PROCEDURE — 99283 EMERGENCY DEPT VISIT LOW MDM: CPT

## 2019-10-29 NOTE — DIAGNOSTIC IMAGING REPORT
EXAMINATION:  KNEE 3VW LT - HOPD    



INDICATION: Trauma



COMPARISON: None

     

FINDINGS:



No acute fracture or dislocation. Alignment is anatomic. Mild tricompartmental

degenerative changes. Small suprapatellar joint effusion.



IMPRESSION: 

No acute osseous injury.



Small subdeltoid joint effusion.



Mild degenerative changes.



Signed by: Christoph Hidalgo MD on 10/29/2019 12:57 PM

## 2019-11-29 ENCOUNTER — HOSPITAL ENCOUNTER (EMERGENCY)
Dept: HOSPITAL 88 - FSED | Age: 84
LOS: 1 days | Discharge: HOME | End: 2019-11-30
Payer: MEDICARE

## 2019-11-29 VITALS — BODY MASS INDEX: 26.22 KG/M2 | HEIGHT: 63 IN | WEIGHT: 148 LBS

## 2019-11-29 DIAGNOSIS — I25.2: ICD-10-CM

## 2019-11-29 DIAGNOSIS — J44.9: ICD-10-CM
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
## 2019-11-29 DIAGNOSIS — Z79.82: ICD-10-CM
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
## 2019-11-29 DIAGNOSIS — I50.9: ICD-10-CM
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
## 2019-11-29 DIAGNOSIS — S80.01XA: Primary | ICD-10-CM

## 2019-11-29 DIAGNOSIS — I11.0: ICD-10-CM

## 2019-11-29 PROCEDURE — 99283 EMERGENCY DEPT VISIT LOW MDM: CPT

## 2019-11-30 NOTE — DIAGNOSTIC IMAGING REPORT
EXAMINATION:  KNEE 3VW RT - HOPD    



INDICATION: Fall.



COMPARISON: None

     

FINDINGS:

No acute fracture or dislocation. Alignment is anatomic. Bipartite patella.



There is a soft tissue wound in the medial distal thigh.



Mild tricompartmental degenerative changes. Small suprapatellar joint effusion.



IMPRESSION: 

No acute osseous injury.



Soft tissue wound in the medial distal thigh.



Small suprapatellar joint effusion.



Signed by: Dr. Clyde Ponce MD on 11/30/2019 12:44 AM

## 2020-11-20 ENCOUNTER — HOSPITAL ENCOUNTER (EMERGENCY)
Dept: HOSPITAL 88 - ER | Age: 85
Discharge: HOME | End: 2020-11-20
Payer: MEDICARE

## 2020-11-20 VITALS — WEIGHT: 127 LBS | HEIGHT: 63 IN | BODY MASS INDEX: 22.5 KG/M2

## 2020-11-20 VITALS — DIASTOLIC BLOOD PRESSURE: 87 MMHG | SYSTOLIC BLOOD PRESSURE: 152 MMHG

## 2020-11-20 DIAGNOSIS — I50.9: ICD-10-CM

## 2020-11-20 DIAGNOSIS — I10: ICD-10-CM
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
## 2020-11-20 DIAGNOSIS — J44.9: ICD-10-CM

## 2020-11-20 DIAGNOSIS — M25.551: Primary | ICD-10-CM
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
## 2020-11-20 DIAGNOSIS — Z99.81: ICD-10-CM

## 2020-11-20 DIAGNOSIS — S70.01XA: ICD-10-CM
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
## 2020-11-20 PROCEDURE — 99283 EMERGENCY DEPT VISIT LOW MDM: CPT

## 2020-11-20 PROCEDURE — 72192 CT PELVIS W/O DYE: CPT

## 2020-11-20 NOTE — DIAGNOSTIC IMAGING REPORT
EXAM: CT Pelvis WITHOUT contrast  

INDICATION:      

^FALL

^[id number]

^1900 

COMPARISON: Same day pelvic radiographs.

TECHNIQUE: Pelvis were scanned utilizing a multidetector helical scanner from

the iliac crest to the pubic symphysis without administration of IV contrast.

Coronal and sagittal reformations were obtained. Routine protocol was

performed. 

     IV CONTRAST: None

     ORAL CONTRAST: Water

            

COMPLICATIONS: None



RADIATION DOSE:

     Total DLP: 171 mGy*cm

     Estimated effective dose: (DLP x 0.015 x size factor) mSv

     CTDIvol has been reviewed. It is below the limits set by the Radiation

Protocol Committee (RPC).

     Dose modulation, iterative reconstruction, and/or weight based adjustment

of the mA/kV was utilized to reduce the radiation dose to as low as reasonably

achievable. 



FINDINGS:



LINES and TUBES: None.



GI TRACT: No abnormal distention, wall thickening, or evidence of bowel

obstruction.       Appendix is normal.



PELVIC ORGANS/BLADDER: Unremarkable.



LYMPH NODES: No lymphadenopathy.



VESSELS: There is moderate atherosclerotic disease in the aorta and major

arterial branches.



PERITONEUM / RETROPERITONEUM: No free air or fluid.



BONES: There are no displaced fractures identified. There are degenerative

changes in the lumbar spine.



SOFT TISSUES: Unremarkable.            



IMPRESSION: 

No acute displaced fracture identified. No acute pelvic abnormality.



Signed by: Lyudmila Welch MD on 11/20/2020 9:02 PM

## 2020-11-20 NOTE — DIAGNOSTIC IMAGING REPORT
X-ray 1 view of the pelvis, 2 views of the right hip.



HISTORY:  Right hip pain.    

COMPARISON: None available.

     

FINDINGS:

Bone/joints: No acute fracture or dislocation. There are mild degenerative

changes of the bilateral hips. The sacroiliac joints are symmetric. No pubic

symphyseal widening.



Soft tissues: No focal soft tissue abnormality.



IMPRESSION: 



1.  No acute fracture or dislocation.

2.  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



Signed by: Lyudmila Welch MD on 11/20/2020 7:59 PM

## 2020-11-20 NOTE — EMERGENCY DEPARTMENT NOTE
History of Present Illnes


History of Present Illness


Chief Complaint:  Extremity Trauma/Pain


History of Present Illness


This is a 84 year old  female Pt arrived to the ER via EMS with c/o 

Right hip pain. pt reports watering her plants approx 2 days ago and states she 

slipped on the water and fell on her bottom reports pain to buttocks and right 

hip/ pelvic pain. No obvious deformity or rotation noted. No LOC, no head 

trauma, no neck pain


Historian:  Patient, Paramedic/EMS


Arrival Mode:  HFD


Onset (how long ago):  day(s) (2)


Location:  RIGHT HIP/PELVIS


Quality:  PAIN


Radiation:  Reports non-radiation


Severity:  moderate


Onset quality:  sudden


Timing of current episode:  constant


Progression:  unchanged


Chronicity:  new


Context:  Denies recent illness


Relieving factors:  none


Exacerbating factors:  none


Associated symptoms:  Reports denies other symptoms (IDALIA LEIVNE MD)





Past Medical/Family History


Physician Review


I have reviewed the patient's past medical and family history.  Any updates have

been documented here.


 (IDALIA LEVINE MD)





Past Medical History


Recent Fever:  No


Clinical Suspicion of Infectio:  No


New/Unexplained Change in Ment:  No


Past Medical History:  Hypertension, COPD, CHF, Anxiety, Depression, 

Osteoarthritis


Other Medical History:  


OXYGEN DEPENDANT AT HOME


Past Surgical History:  Cholecysctectomy


Other Surgery:  


open heart surgery, 





breast implants, 





stomach band,


 (IDALIA LEVINE MD)





Social History


Smoking Cessation:  Former smoker


Counseling Performed:  No


Alcohol Use:  None


Any Illegal Drug Use:  No


TB Exposure/Symptoms:  No


Physically hurt or threatened:  No


 (IDALIA LEVINE MD)





Family History


Family history of heart diseas:  No


 (IDALIA LEVINE MD)





Other


Last Tetanus:  UTD


Any Pre-Existing Lines (PICC,:  No


 (IDALIA LEVINE MD)





Review of Systems


Review of Systems


Constitutional:  Reports no symptoms


EENTM:  Reports no symptoms


Cardiovascular:  Reports no symptoms


Respiratory:  Reports no symptoms


Gastrointestinal:  Reports no symptoms


Genitourinary:  Reports no symptoms


Musculoskeletal:  Reports as per HPI


Integumentary:  Reports no symptoms


Neurological:  Reports no symptoms


Psychological:  Reports no symptoms


Endocrine:  Reports no symptoms


Hematological/Lymphatic:  Reports no symptoms (IDALIA LEVINE MD)





Physical Exam


Related Data


Allergies:  


Coded Allergies:  


     No Known Allergies (Unverified , 19)


Triage Vital Signs





Vital Signs








  Date Time  Temp Pulse Resp B/P (MAP) Pulse Ox O2 Delivery O2 Flow Rate FiO2


 


20 15:51 98.3 70 16 129/78 100 Nasal Cannula 3.0 








Vital signs reviewed:  Yes


 (IDALIA LEVINE MD)





Physical Exam


CONSTITUTIONAL





Constitutional:  Present well-developed, Present well-nourished


HENT


HENT:  Present normocephalic, Present atraumatic, Present oropharynx 

clear/moist, Present nose normal


HENT L/R:  Present left ext ear normal, Present right ext ear normal


EYES





Eyes:  Reports PERRL, Reports conjunctivae normal


NECK


Neck:  Present ROM normal


PULMONARY


Pulmonary:  Present effort normal, Present breath sounds normal


CARDIOVASCULAR





Cardiovascular:  Present regular rhythm, Present heart sounds normal, Present 

capillary refill normal, Present normal rate


GASTROINTESTINAL





Abdominal:  Present soft, Present nontender, Present bowel sounds normal; 


   Absent tender


GENITOURINARY





Genitourinary:  Present exam deferred


SKIN


Skin:  Present warm, Present dry


MUSCULOSKELETAL





Musculoskeletal:  Present ROM normal, Present other (good ROM RIGHT HIP, PELVIS 

STABLE, POINTS TO RIGHT PELVIS AREA WHERE SHE SAYS PAIN IS)


NEUROLOGICAL





Neurological:  Present alert, Present oriented x 3, Present no gross motor or 

sensory deficits


PSYCHOLOGICAL


Psychological:  Present mood/affect normal, Present judgement normal 

(IDALIA LEVINE MD)





Results


Imaging


Imaging results reviewed:  Yes


Impressions


                                        


                        [place] [address]








Patient Name: JARRETT BEY                          MR #: [id number]        


     


: 1935                         Age/Sex: 84/F


Acct #: [id number]                    Req #: [id number]


Adm Physician:                                      


Ordered by: IDALIA LEVINE MD                    Report #: [id number] 


Location:                             Room/Bed:           


                                                                                


________________________________________________________________________________


___________________





Procedure: [id number] CT/CT PELVIS WO


Exam Date: 20                            Exam Time: 1900








                              REPORT STATUS: Signed





EXAM: CT Pelvis WITHOUT contrast  


INDICATION:      


^FALL


^2020


^ 


COMPARISON: Same day pelvic radiographs.


TECHNIQUE: Pelvis were scanned utilizing a multidetector helical scanner from


the iliac crest to the pubic symphysis without administration of IV contrast.


Coronal and sagittal reformations were obtained. Routine protocol was


performed. 


     IV CONTRAST: None


     ORAL CONTRAST: Water


            


COMPLICATIONS: None





RADIATION DOSE:


     Total DLP: 171 mGy*cm


     Estimated effective dose: (DLP x 0.015 x size factor) mSv


     CTDIvol has been reviewed. It is below the limits set by the Radiation


Protocol Committee (RPC).


     Dose modulation, iterative reconstruction, and/or weight based adjustment


of the mA/kV was utilized to reduce the radiation dose to as low as reasonably


achievable. 





FINDINGS:





LINES and TUBES: None.





GI TRACT: No abnormal distention, wall thickening, or evidence of bowel


obstruction.       Appendix is normal.





PELVIC ORGANS/BLADDER: Unremarkable.





LYMPH NODES: No lymphadenopathy.





VESSELS: There is moderate atherosclerotic disease in the aorta and major


arterial branches.





PERITONEUM / RETROPERITONEUM: No free air or fluid.





BONES: There are no displaced fractures identified. There are degenerative


changes in the lumbar spine.





SOFT TISSUES: Unremarkable.            





IMPRESSION: 


No acute displaced fracture identified. No acute pelvic abnormality.





Signed by: Imelda Shabazz MD on 2020 9:02 PM








Dictated By: IMELDA SHABAZZ MD


Electronically Signed By: IMELDA SHABAZZ MD on 20


Transcribed By: PAWEL on 20 








COPY TO:   IDALIA LEVINE MD~














                        [place] [address]








Patient Name: JARRETT BEY                          MR #: [id number]        


     


: 1935                         Age/Sex: 84/F


Acct #: [id number]                    Req #: [id number]


Adm Physician:                                      


Ordered by: IDALIA LEVINE MD                    Report #: [id number] 


Location: ER                            Room/Bed:           


   _____________________________________________________________________________


______________________





Procedure: [id number] CT/CT PELVIS WO


Exam Date: 20                            Exam Time: 








                              REPORT STATUS: Signed





EXAM: CT Pelvis WITHOUT contrast  


INDICATION:      


^FALL


^2020


^190 


COMPARISON: Same day pelvic radiographs.


TECHNIQUE: Pelvis were scanned utilizing a multidetector helical scanner from


the iliac crest to the pubic symphysis without administration of IV contrast.


Coronal and sagittal reformations were obtained. Routine protocol was


performed. 


     IV CONTRAST: None


     ORAL CONTRAST: Water


            


COMPLICATIONS: None





RADIATION DOSE:


     Total DLP: 171 mGy*cm


     Estimated effective dose: (DLP x 0.015 x size factor) mSv


     CTDIvol has been reviewed. It is below the limits set by the Radiation


Protocol Committee (RPC).


     Dose modulation, iterative reconstruction, and/or weight based adjustment


of the mA/kV was utilized to reduce the radiation dose to as low as reasonably


achievable. 





FINDINGS:





LINES and TUBES: None.





GI TRACT: No abnormal distention, wall thickening, or evidence of bowel


obstruction.       Appendix is normal.





PELVIC ORGANS/BLADDER: Unremarkable.





LYMPH NODES: No lymphadenopathy.





VESSELS: There is moderate atherosclerotic disease in the aorta and major


arterial branches.





PERITONEUM / RETROPERITONEUM: No free air or fluid.





BONES: There are no displaced fractures identified. There are degenerative


changes in the lumbar spine.





SOFT TISSUES: Unremarkable.            





IMPRESSION: 


No acute displaced fracture identified. No acute pelvic abnormality.





Signed by: Imelda Shabazz MD on 2020 9:02 PM








Dictated By: IMELDA SHABAZZ MD


Electronically Signed By: IMELDA SHABAZZ MD on 20


Transcribed By: PAWEL on 20 








COPY TO:   IDALIA LEVINE MD~











 (NICHOLAS OWENS DO)





Assessment & Plan


Medical Decision Making


MDM


FALL WITH RIGHT HIP/PELVIS PAIN - CHECK XRAYS R/O FX


 (IDALIA LEVINE MD)





Reassessment


Reassessment


XRAYS LOOK OKAY TO ME, WILL GET CT PELVIS - REPORT TO DR OWENS TO F/U AND DISPO


 (IDALIA LEVINE MD)


Reassessment time:  19:16


Reassessment


Patient seen at bedside with R hip pain radiates to R pelvic region


 (NICHOLAS OWENS DO)


Assessment & Plan


Final Impression:  


(1) Fall


(2) Contusion (IDALIA LEVINE MD)


Depart Disposition:  HOME, SELF-CARE


Last Vital Signs











  Date Time  Temp Pulse Resp B/P (MAP) Pulse Ox O2 Delivery O2 Flow Rate FiO2


 


20 15:51 98.3 70 16 129/78 100 Nasal Cannula 3.0 








 (IDALIA LEVINE MD)


Home Meds


Reported Medications


Metoprolol Succinate (METOPROLOL SUCCINATE) 25 Mg Tab.er.24h, 25 MG PO DAILY


   8/10/19


Furosemide (LASIX) 20 Mg Tablet, 20 MG PO QAM, #30 TAB


   8/10/19


Ipratropium/Albuterol Sulfate (IPRAT-ALBUT 0.5-3(2.5) MG/3 ML) 3 Ml Ampul.neb, 1

UNIT INH QID PRN for WHEEZING


   8/10/19


Lisinopril (LISINOPRIL) 10 Mg Tablet, 10 MG PO HS, #30 TAB


   8/10/19


Aspirin (ASPIRIN) 81 Mg Tab.chew, 81 MG PO HS


   19


Diphenhydramine Hcl (BENADRYL) 25 Mg Capsule, 50 MG PO PRN PRN for ALLERGY


   19


Bumetanide (BUMETANIDE) 1 Mg Tablet, 1 MG PO BID, #30 TAB


   19


Metoprolol Tartrate (METOPROLOL TARTRATE) 25 Mg Tablet, 25 MG PO BID, TAB


   19


Sertraline Hcl (SERTRALINE HCL) 50 Mg Tablet, 50 MG PO DAILY, #30 TAB


   19


Simvastatin (SIMVASTATIN) 40 Mg Tablet, 40 MG PO 2100, #30 TAB


   19


Nortriptyline Hcl (NORTRIPTYLINE HCL) 25 Mg Capsule, 25 MG PO BID


   19


Mirtazapine (MIRTAZAPINE) 15 Mg Tab, 15 MG PO 1300, TAB


   19


Lorazepam (LORAZEPAM) 1 Mg Tablet, 1 MG PO BID, TAB


   19


Melatonin (MELATONIN) 3 Mg Tablet, 10 MG PO HS, TAB


   19











IDALIA LEVINE MD               2020 16:48


NICHOLAS OWENS DO                2020 19:16

## 2020-11-21 NOTE — XMS REPORT
Continuity of Care Document

                             Created on: 2020



JARRETT BEY

External Reference #: [id number]

: 1935

Sex: Female



Demographics





                          Address                   [address] [place]  [address]

 

                          Home Phone                [phone]

 

                          Preferred Language        English

 

                          Marital Status            Unknown

 

                          Religion Affiliation     Unknown

 

                          Race                      Unknown

 

                                        Additional Race(s) 

White



 

                          Ethnic Group              Non-





Author





                          Author                    [facility]

t

 

                          Organization              [facility]

 

                          Address                   [facility]

[place]  [address]



 

                          Phone                     Unavailable







Support





                Name            Relationship    Address         Phone

 

                    NA MARINELLI   ECON                [address] [place]  [address]                      Unavailable







Care Team Providers





                    Care Team Member Name Role                Phone

 

                    MD KATARINA ADAMS MD PCP                 [phone]

 

                    NICHOLAS OWENS      Attphys             Unavailable

 

                    ZEBALLOSAMEE ROQUE  Attphys             Unavailable

 

                    DAVID, EIMLY SULLIVAN       Attphys             Unavailable

 

                    ANGIE,  MONTE     Attphys             Unavailable

 

                    ANGIE,  MONTE     Admphys             Unavailable







Payers





           Payer Name Policy Type Policy Number Effective Date Expiration Date ANGELO peters

 

           [employer] Medicare Replacement            [id number]   2020 00:00:00 

           [facility]







Problems





           Condition Name Condition Details Condition Category Status     Onset 

Date Resolution

Date            Last Treatment Date Treating Clinician Comments        Source

 

       Acute renal insufficiency Acute renal insufficiency Problem Active       

                             [facility]

 

       Syncope and collapse Syncope and collapse Problem Active                 

                   [facility]

 

       Transient hypotension Transient hypotension Problem Active               

                     [facility]

 

       Fall          Problem Active                                    [facility]

[facility]

 

       Contusion        Problem Active                                    [facility]

 

                          Right bundle branch block                         Righ

t bundle branch block     

                  Active                                                Problem 
                      2016                                                
Rakesh Zaman MD, PA                     Problem    Active                      

     2016 05:13:08 

                                                    [facility]

 

                          Aortic valve disorders                            Aort

ic valve disorders           

            Active                                                Problem       
                2016                                                Rakesh Zaman MD, PA                     Problem Active                  2016 05

:13:08                 

[facility]

 

                          Chronic diastolic heart failure                       

  Chronic diastolic heart 

failure                        Active                                           
    Problem                        2016                                   
            Rakesh Zaman MD, PA                     Problem      Active        

                         

2016 05:13:08                                         [facility]

 

                          Chronic diastolic (congestive) heart failure          

               Chronic 

diastolic (congestive) heart failure                        Active              
                                 Problem                        2016      
                                         Rakesh Zaman MD, PA                   
        Problem Active                  2016 05:13:08                 [facility]

 

                          Unspecified right bundle-branch block                 

        Unspecified right 

bundle-branch block                        Active                               
                Problem                        2016                       
                        Rakesh Zaman MD, PA                     Problem        

     Active              

                          2016 05:13:08                           [facility]

 [facility]

 

                          Obstructive hypertrophic cardiomyopathy               

          Obstructive 

hypertrophic cardiomyopathy                        Active                       
                        Problem                        2016               
                                Rakesh Zaman MD, PA                     Problem

          Active                        2016 05:13:08                     

[facility] [place]

 

                          Orthostatic hypotension                           Orth

ostatic hypotension         

              Active                                                Problem     
                  2016                                                
Rakesh Zaman MD, PA                     Problem    Active                      

     2016 05:13:08 

                                                    [facility]

 

                          Angina pectoris                                   Sherine

na pectoris                        

Active                                                Problem                   
    2016                                                Rakesh Zaman MD, 
PA                     Problem Active                  2016 05:13:08      

           [facility]

 

                          Orthostatic hypotension                           Orth

ostatic hypotension         

              Active                                                Problem     
                  2016                                                
Rakesh Zaman MD, PA                     Problem    Active                      

     2016 05:13:08 

                                                    [facility] [place]

 

                          Chronic obstructive pulmonary disease, unspecified    

                     

Chronic obstructive pulmonary disease, unspecified                        Active
                                               Problem                        
2016                                                Rakesh Zaman MD, PA  
                  Problem Active                  2016 05:13:08           

      [facility] [place]







Allergies, Adverse Reactions, Alerts

This patient has no known allergies or adverse reactions.



Social History





           Social Habit Start Date Stop Date  Quantity   Comments   Source

 

           TobaccoUse: 2015-10-07 00:00:00 2015-10-07 00:00:00                  

     [facility] Gar[facility]

 

           Sex Assigned At Birth 1935 00:00:00 1935 00:00:00 Female 

               [facility]







Medications





             Ordered Medication Name Filled Medication Name Start Date   Stop Da

te    Current 

Medication? Ordering Clinician Indication Dosage     Frequency  Signature (SIG) 

Comments                  Components                Source

 

     Aspirin Aspirin           Yes            81        Bedtime           [facility]

 

     Bumetanide Bumetanide           Yes            1         Twice A Day       

    [facility]

 

                          Diphenhydramine Hcl (Benadryl) 25 Mg CAPSULE Diphenhyd

ramine Hcl (Benadryl) 25 

Mg CAPSULE             Yes               50          As Needed as needed for All

ergy             [facility]

 

        Furosemide (Lasix) 20 Mg TABLET Furosemide (Lasix) 20 Mg TABLET         

        Yes                     20       

                Every Morning                                   [facility]

 

                          Ipratropium/Albuterol Sulfate (Iprat-Albut 0.5-3(2.5) 

Mg/3 Ml) 3 Ml AMPUL.NEB 

Ipratropium/Albuterol Sulfate (Iprat-Albut 0.5-3(2.5) Mg/3 Ml) 3 Ml AMPUL.NEB   

                        

        Yes                     1               Four Times Daily as needed for W

heezing                 [facility]

 

     Lisinopril Lisinopril           Yes            10        Bedtime           

[facility]

 

     Lorazepam Lorazepam           Yes            1         Twice A Day         

  [facility]

 

     Melatonin Melatonin           Yes            10        Bedtime           [facility]

[facility]

 

     Metoprolol Succinate Metoprolol Succinate           Yes            25      

  Daily           [facility]

 

     Metoprolol Tartrate Metoprolol Tartrate           Yes            25        

Twice A Day           [facility]

 

     Mirtazapine Mirtazapine           Yes            15        Today At 1:00PM 

          [facility]

 

     Nortriptyline Hcl Nortriptyline Hcl           Yes            25        Twic

e A Day           [facility]

 

     Sertraline Hcl Sertraline Hcl           Yes            50        Daily     

      [facility]

 

     Simvastatin Simvastatin           Yes            40        Today At 9:00PM 

          [facility]

 

     Albuterol Sulfate Albuterol Sulfate      2019-08-10 00:00:00 No            

                          [facility]

 

     Lisinopril Lisinopril      2019-08-10 00:00:00 No             10        Twi

ce A Day           [facility]







Vital Signs





             Vital Name   Observation Time Observation Value Comments     Source

 

             Body Temperature 2020 22:25:00 98.2 [degF]               [facility]

 

             Heart Rate   2020 22:25:00 74 /min                   [facility]

 

             Respiratory rate 2020 22:25:00 18 /min                   [facility]

 

             BP Systolic  2020 22:25:00 152 mm[Hg]                [facility]

 

             BP Diastolic 2020 22:25:00 87 mm[Hg]                 [facility]

 

             Oxygen saturation by Pulse oximetry 2020 22:25:00 98 /min    

               [facility]

 

             Weight       2020 15:51:00 127 [lb_av]               [facility]

 

             BMI (Body Mass Index) 2020 15:51:00 22.5 kg/m2               

 [facility]







Procedures





                Procedure       Date / Time Performed Performing Clinician Sour

e

 

                Computed tomography of pelvis without contrast 2020 00:00:

00                 [facility]







Plan of Care





             Planned Activity Planned Date Details      Comments     Source

 

             Instructions              Bursitis - Hip              [facility]







Encounters





             Start Date/Time End Date/Time [facility] Encounter ID    Source

 

           2020 16:32:00 2020 21:40:00 Departed Emergency Room 1    

      NICHOLAS OWENS 

[facility] [id number]        [facility]

 

             2019 23:57:00 2019 01:14:00 Departed Emergency Room 1  

          ASIF 

ROQUE          [facility]          [id number]    [facility]

 

           2019-10-29 12:07:00 2019-10-29 13:06:00 Departed Emergency Room 1    

      DAVID, [facility]              [id number]        [facility]

 

           2019 09:34:00 2019 10:55:00 Departed Emergency Room 1    

      DAVID, [facility]              [id number]        [facility]

 

           2019 08:46:00 2019-08-10 11:02:00 Discharged Inpatient 1       

   KATARINA ADAMS 

[facility]              [id number]        [facility]

 

        2019-07-10 07:48:00 2019-07-10 07:48:00 Emergency E               [facility]  

  [facility]    [address]    [facility]

 

        2019 07:27:00 2019 07:27:00 Outpatient                 [facility] 

   CAR     [address]    [facility]

 

        2019 15:06:00 2019 15:06:00 Outpatient                 [facility] 

   PUL     [address]    [facility]

 

        2019 13:50:00 2019 13:50:00 Outpatient                 [facility] 

   PUL     [address]    [facility]

 

          2016-11-10 11:01:00 2016-11-10 11:01:00 Outpatient                    

 Rakesh Zaman MD, PA 

[place]      [id number]                     [employer]







Results





           Test Description Test Time  Test Comments Results    Result Comments 

Source

 

                CT PELVIS WO    2020 20:47:00                 

[facility]        : 1935        
Sex: F************************************************************              
                                                                        [place] [address]      Patient Name: JARRETT BEY         
                       MR #: [id number]                  : 1935        
                           Age/Sex: 84/F  Acct #: [id number]                  
            Req #: [id number]  [facility] Physician:                                   
                   Ordered by: IDALIA LEVINE MD                         Report 
#: [id number]        Location: ER                                      Room/Bed: 
                  
________________________________________________________________________________

___________________    Procedure: [id number] CT/CT PELVIS WO  Exam Date: 20
                            Exam Time:                                      
         REPORT STATUS: Signed    EXAM: CT Pelvis WITHOUT contrast     
INDICATION:          2020    COMPARISON: Same day pelvic 
radiographs.   TECHNIQUE: Pelvis were scanned utilizing a multidetector helical 
scanner from   the iliac crest to the pubic symphysis without administration of 
IV contrast.   Coronal and sagittal reformations were obtained. Routine protocol
 was   performed.         IV CONTRAST: None        ORAL CONTRAST: Water         
         COMPLICATIONS: None      RADIATION DOSE:        Total DLP: 171 mGy*cm  
      Estimated effective dose: (DLP x 0.015 x size factor) mSv        CTDIvol 
has been reviewed. It is below the limits set by the Radiation   Protocol 
Committee (RPC).        Dose modulation, iterative reconstruction, and/or weight
 based adjustment   of the mA/kV was utilized to reduce the radiation dose to as
 low as reasonably   achievable.       FINDINGS:      LINES and TUBES: None.    
  GI TRACT: No abnormal distention, wall thickening, or evidence of bowel   
obstruction.       Appendix is normal.      PELVIC ORGANS/BLADDER: Unremarkable.
      LYMPH NODES: No lymphadenopathy.      VESSELS: There is moderate 
atherosclerotic disease in the aorta and major   arterial branches.      
PERITONEUM / RETROPERITONEUM: No free air or fluid.      BONES: There are no 
displaced fractures identified. There are degenerative   changes in the lumbar 
spine.      SOFT TISSUES: Unremarkable.                  IMPRESSION:    No acute
 displaced fracture identified. No acute pelvic abnormality.      Signed by: 
Imelda Shabazz MD on 2020 9:02 PM        Dictated By: IMELDA SHABAZZ MD  
Electronically Signed By: IMELDA SHABAZZ MD on 20  Transcribed By: 
PAWEL on 20       COPY TO:   IDALIA LEVINE MD                      

               

 

                HIP RIGHT 2-3 VW (+/- PELVIS) 2020 19:57:00               

  

[facility]        : 1935        
Sex: F************************************************************              
                                                                        [place] [address]      Patient Name: MAIDA,JARRETT S         
                       MR #: [id number]                  : 1935        
                           Age/Sex: 84/F  Acct #: [id number]                  
            Req #: [id number]  Adm Physician:                                   
                   Ordered by: IDALIA LEVINE MD                         Report 
#: [id number]        Location: ER                                      Room/Bed: 
                  
________________________________________________________________________________

___________________    Procedure: [id number] DX/HIP RIGHT 2-3 VW (+/- PELVIS)  
Exam Date:                             Exam Time:                               
                REPORT STATUS: Signed    X-ray 1 view of the pelvis, 2 views of 
the right hip.      HISTORY:  Right hip pain.       COMPARISON: None available. 
          FINDINGS:   Bone/joints: No acute fracture or dislocation. There are 
mild degenerative   changes of the bilateral hips. The sacroiliac joints are 
symmetric. No pubic   symphyseal widening.      Soft tissues: No focal soft 
tissue abnormality.      IMPRESSION:       1.  No acute fracture or dislocation.
   2.  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      Signed by: Imelda Shabazz MD on 2020 7:59 PM        Dictated By: IMELDA SHABAZZ MD  
Electronically Signed By: IMELDA SHABAZZ MD on 20  Transcribed By: 
PAWEL on 20       COPY TO:   IDALIA LEVINE MD                      

               

 

                KNEE 3VW RT - HOPD 2019 00:41:00                          

                              

                                               [place] [address]
[address]      Patient Name: JARRETT BEY                                   MR #: 
[id number]                     : 1935                                  
 Age/Sex: 83/F  Acct #: [id number]                              Req #: [id number]
[id number]  Adm Physician:                                                      
Ordered by: ROQUE GOLD MD                            Report #: [id number]
        Location: [facility]                                    Room/Bed:             
        
________________________________________________________________________________

___________________    Procedure: [id number] HOPD/KNEE 3VW RT - HOPD  Exam Date: 
19                            Exam Time: 0020                             
                 REPORT STATUS: Signed    EXAMINATION:  KNEE 3VW RT - HOPD      
    INDICATION: Fall.      COMPARISON: None           FINDINGS:   No acute 
fracture or dislocation. Alignment is anatomic. Bipartite patella.      There is
 a soft tissue wound in the medial distal thigh.      Mild tricompartmental 
degenerative changes. Small suprapatellar joint effusion.      IMPRESSION:    No
 acute osseous injury.      Soft tissue wound in the medial distal thigh.      
Small suprapatellar joint effusion.      Signed by: Dr. Becca Donahue MD on 
2019 12:44 AM        Dictated By: BECCA DONAHUE MD  Electronically Signed By:
 BECCA DONAHUE MD on 19  Transcribed By: PAWEL on 19       
COPY TO:   ROQUE GOLD MD                                     

 

                KNEE 3VW LT - HOPD 2019-10-29 12:55:00                          

                              

                                               [place] [address]
5      Patient Name: JARRETT BEY                                   MR #: 
[id number]                     : 1935                                  
 Age/Sex: 83/F  Acct #: [id number]                              Req #: [id number]
[id number]  [facility] Physician:                                                      
Ordered by: NATE HERNANDEZ MD                            Report #: [id number]     
   Location: [facility]                                    Room/Bed:                  
   
________________________________________________________________________________

___________________    Procedure: [id number] HOPD/KNEE 3VW LT - HOPD  Exam Date: 
10/29/19                            Exam Time: 1236                             
                 REPORT STATUS: Signed    EXAMINATION:  KNEE 3VW LT - HOPD      
    INDICATION: Trauma      COMPARISON: None           FINDINGS:      No acute 
fracture or dislocation. Alignment is anatomic. Mild tricompartmental   
degenerative changes. Small suprapatellar joint effusion.      IMPRESSION:    No
 acute osseous injury.      Small subdeltoid joint effusion.      Mild 
degenerative changes.      Signed by: Lilia Pena MD on 10/29/2019 12:57 PM     
   Dictated By: LILIA PENA MD  Electronically Signed By: LILIA PENA MD on 
10/29/19 1257  Transcribed By: PAWEL on 10/29/19 1257       COPY TO:   
NATE HERNANDEZ MD                                      

 

                RIBS UNILAT W/CXR- HOPD 2019 10:42:00                     

                              

                                                    [place] [address]      Patient Name: JARRETT BEY                                 
  MR #: [id number]                     : 1935                          
         Age/Sex: 83/F  Acct #: [id number]                              Req #:
 [id number]  Adm Physician:                                                     
 Ordered by: NATE HERNANDEZ MD                            Report #: [id number]    
    Location: [facility]                                    Room/Bed:                 
    
________________________________________________________________________________

___________________    Procedure: [id number] HOPD/RIBS UNILAT W/CXR- HOPD  Exam 
Date: 19                            Exam Time: 1022                       
                       REPORT STATUS: Signed    EXAMINATION:  RIBS UNILAT W/CXR-
 HOPD          INDICATION: Left rib pain, trauma      COMPARISON: Chest 
radiograph 2019           FINDINGS:      LINES/TUBES:None      LUNGS:No 
focal consolidation or pulmonary edema. Unchanged right   hemidiaphragmatic 
elevation.      PLEURA:No pleural effusion or pneumothorax.      MEDIASTINUM:The
 cardiomediastinal silhouette appears unchanged in size and   shape.      
BONES/SOFT TISSUES:No displaced rib fractures.      ABDOMEN:No free air under 
the diaphragm.         IMPRESSION:    No displaced rib fractures.      No focal 
pneumonia or pulmonary edema.      Unchanged right hemidiaphragmatic elevation. 
     Signed by: Lilia Pena MD on 2019 10:45 AM        Dictated By: LILIA PENA MD  Electronically Signed By: LILIA PENA MD on 19  Transcribed By:
 PAWEL on 19       COPY TO:   NATE HERNANDEZ MD                      

               

 

                    Blood Culture       2019 18:06:00   

 

                                        Test Item

 

             Blood Culture (test code = [id number]) NO GROWTH AFTER 5 DAYS, FINAL 

REPORT                            





[facility] [id number] 18:06:00* 



             Test Item    Value        Reference Range Interpretation Comments

 

             Blood Culture (test code = 36399163) NO GROWTH AFTER 5 DAYS, FINAL 

REPORT                            





[facility] Mcmnout1063-48-15 18:06:00* 



             Test Item    Value        Reference Range Interpretation Comments

 

             Blood Culture (test code = 09085501) NO GROWTH AFTER 5 DAYS, FINAL 

REPORT                            





[facility] Qadeszt2635-05-63 18:06:00* 



             Test Item    Value        Reference Range Interpretation Comments

 

                          Blood Culture (test code = 26618037)                  

           NO GROWTH AFTER

 48 HOURS                                                    





[facility] 
12:26:00                                                                        
              [place] [address]      Patient Name: 
JARRETT BEY                                   MR #: [id number]             
        : 1935                                   Age/Sex: 83/F  Acct #:
 [id number]                              Req #: [id number]  Adm Physician: 
KATARINA ADAMS MD                                      Ordered by: KATARINA ADAMS MD                            Report #: [id number]        Location: MED/SURG    
                            Room/Bed: [facility]               
_____________________________________________
______________________________________________________    Procedure: [id number] D
X/CHEST SINGLE (PORTABLE)  Exam Date: 19                            Exam T
bharath: 1110                                              REPORT STATUS: Signed    
EXAMINATION:  CHEST SINGLE (PORTABLE)          INDICATION: Shortness of breath  
    COMPARISON: Chest radiograph of 2019           FINDINGS:      LINES/TUBE
S:Sternotomy wires intact.      LUNGS:The lungs are moderately inflated. Mild pa
tchy opacity at the left lung   base. Unchanged elevation of the right hemidiaph
ragm.      PLEURA:Trace right pleural effusion. No pneumothorax.      MEDIASTINU
M:The cardiomediastinal silhouette appears unchanged in size and   shape. Athero
sclerotic calcifications of the thoracic aorta.      BONES/SOFT TISSUES:No acute
 osseous injury.      ABDOMEN:No free air under the diaphragm.         IMPRESSIO
N:    Mild patchy opacity at the left lung base, most likely subsegmental   atel
ectasis.      Trace right pleural effusion.      Unchanged right hemidiaphragmat
ic elevation.      Signed by: Lilia Pena MD on 2019 12:29 PM        Dictate
d By: LILIA PENA MD  Electronically Signed By: LILIA PENA MD on 19 1229  T
ranscribed By: PAWEL on 19 1229       COPY TO:   KATARINA ADAMS MD       
  Sodium Quuhw4445-88-94 06:17:00* 



             Test Item    Value        Reference Range Interpretation Comments

 

             Sodium Level (test code = 2951-2) 145          136-145             

       





[facility]Pot[facility] Nvoij3975-22-80 06:17:00* 



             Test Item    Value        Reference Range Interpretation Comments

 

             Potassium Level (test code = 2823-3) 3.6          3.5-5.1          

          





[facility] CenterChloride Wqwxg4803-78-77 06:17:00* 



             Test Item    Value        Reference Range Interpretation Comments

 

             Chloride Level (test code = 2075-0) 107                      

         





[facility] CenterCarbon Dioxide Zbntb3402-07-63 06:17:00* 



             Test Item    Value        Reference Range Interpretation Comments

 

             Carbon Dioxide Level (test code = 2028-9) 29           22-29       

               





[facility] CenterAnion Htl9936-83-77 06:17:00* 



             Test Item    Value        Reference Range Interpretation Comments

 

             Anion Gap (test code = 33037-3) 12.6         8-16                  

     





[facility] CenterBlood Urea Ptoctdjr9113-69-02 06:17:00* 



             Test Item    Value        Reference Range Interpretation Comments

 

             Blood Urea Nitrogen (test code = 3094-0) 23           7-26         

              





[facility] CenterCreatinine2019-08-09 06:17:00* 



             Test Item    Value        Reference Range Interpretation Comments

 

             Creatinine (test code = 2160-0) 1.27         0.57-1.11    H        

     





[facility] CenterBUN/Creatinine Ajuye7230-11-15 06:17:00* 



             Test Item    Value        Reference Range Interpretation Comments

 

             BUN/Creatinine Ratio (test code = 3097-3) 18           6-25        

               





[facility] CenterEstimat Glomerular Filtration Rate
2019 06:17:00* 



             Test Item    Value        Reference Range Interpretation Comments

 

             Estimat Glomerular Filtration Rate (test code = 203499348) 40      

     >60          L             





Ranges were taken from the National Kidney Disease Education Program and the [facility] literature.Reference ranges:60 or greater: Wrllti43-91 (
for 3 consecutive months): Chronic kidney disease 15 or less: Kidney failureHereford Regional Medical CenterGlucose Rlpqb9678-33-32 06:17:00* 



             Test Item    Value        Reference Range Interpretation Comments

 

             Glucose Level (test code = MFA0262) 93                       

         





[facility] CenterCalcium Pgtqz2762-96-50 06:17:00* 



             Test Item    Value        Reference Range Interpretation Comments

 

             Calcium Level (test code = 36178-9) 9.0          8.4-10.2          

         





[facility] Kle[facility]odium Zcapm9058-18-53 06:17:00* 



             Test Item    Value        Reference Range Interpretation Comments

 

             Sodium Level (test code = 2951-2) 145          136-145             

       





[facility] CenterPotassium Faofu5924-31-84 06:17:00* 



             Test Item    Value        Reference Range Interpretation Comments

 

             Potassium Level (test code = 2823-3) 3.6          3.5-5.1          

          





[facility] CenterChloride Pxcoc6490-71-44 06:17:00* 



             Test Item    Value        Reference Range Interpretation Comments

 

             Chloride Level (test code = 2075-0) 107                      

         





[facility] CenterCarbon Dioxide Emhjg2412-75-61 06:17:00* 



             Test Item    Value        Reference Range Interpretation Comments

 

             Carbon Dioxide Level (test code = 2028-9) 29           22-29       

               





[facility] CenterAnion Ssp9133-09-73 06:17:00* 



             Test Item    Value        Reference Range Interpretation Comments

 

             Anion Gap (test code = 33037-3) 12.6         8-16                  

     





[facility] CenterBlood Urea Kqfvexwr9632-47-68 06:17:00* 



             Test Item    Value        Reference Range Interpretation Comments

 

             Blood Urea Nitrogen (test code = 3094-0) 23           7-26         

              





[facility] CenterCreatinine2019-08-09 06:17:00* 



             Test Item    Value        Reference Range Interpretation Comments

 

             Creatinine (test code = 2160-0) 1.27         0.57-1.11    H        

     





[facility] CenterBUN/Creatinine Bijgn4973-12-11 06:17:00* 



             Test Item    Value        Reference Range Interpretation Comments

 

             BUN/Creatinine Ratio (test code = 3097-3) 18           6-25        

               





[facility] CenterEstimat Glomerular Filtration Rate
2019 06:17:00* 



             Test Item    Value        Reference Range Interpretation Comments

 

             Estimat Glomerular Filtration Rate (test code = 139963600) 40      

     >60          L             





Ranges were taken from the National Kidney Disease Education Program and the [place]
[facility] literature.Reference ranges:60 or greater: Ruvqrn69-59 (
for 3 consecutive months): Chronic kidney disease 15 or less: Kidney failureHereford Regional Medical CenterGlucose Tbbqs7718-49-24 06:17:00* 



             Test Item    Value        Reference Range Interpretation Comments

 

             Glucose Level (test code = GJQ3296) 93                       

         





[facility] CenterCalcium Ewulk3103-85-93 06:17:00* 



             Test Item    Value        Reference Range Interpretation Comments

 

             Calcium Level (test code = 86314-1) 9.0          8.4-10.2          

         





[facility]odium Itoft0708-14-43 06:17:00* 



             Test Item    Value        Reference Range Interpretation Comments

 

             Sodium Level (test code = 2951-2) 145          136-145             

       





[facility] CenterPotassium Clill4052-75-02 06:17:00* 



             Test Item    Value        Reference Range Interpretation Comments

 

             Potassium Level (test code = 2823-3) 3.6          3.5-5.1          

          





[facility] CenterChloride Rpxdm1276-74-25 06:17:00* 



             Test Item    Value        Reference Range Interpretation Comments

 

             Chloride Level (test code = 2075-0) 107                      

         





[facility] CenterCarbon Dioxide Ixpwo0919-22-22 06:17:00* 



             Test Item    Value        Reference Range Interpretation Comments

 

             Carbon Dioxide Level (test code = 2028-9) 29           22-29       

               





[facility] CenterAnion Jkq9783-43-49 06:17:00* 



             Test Item    Value        Reference Range Interpretation Comments

 

             Anion Gap (test code = 33037-3) 12.6         8-16                  

     





[facility] CenterBlood Urea Nzurudbb1776-19-95 06:17:00* 



             Test Item    Value        Reference Range Interpretation Comments

 

             Blood Urea Nitrogen (test code = 3094-0) 23           7-26         

              





[facility] CenterCreatinine2019-08-09 06:17:00* 



             Test Item    Value        Reference Range Interpretation Comments

 

             Creatinine (test code = 2160-0) 1.27         0.57-1.11    H        

     





[facility] CenterBUN/Creatinine Cvtzg1482-36-37 06:17:00* 



             Test Item    Value        Reference Range Interpretation Comments

 

             BUN/Creatinine Ratio (test code = 3097-3) 18           6-25        

               





[facility] CenterEstimat Glomerular Filtration Rate
2019 06:17:00* 



             Test Item    Value        Reference Range Interpretation Comments

 

             Estimat Glomerular Filtration Rate (test code = 545194249) 40      

     >60          L             





Ranges were taken from the National Kidney Disease Education Program and the [place]
[facility] literature.Reference ranges:60 or greater: Czdopp98-59 (
for 3 consecutive months): Chronic kidney disease 15 or less: Kidney failureHereford Regional Medical CenterGlucose Mwcnc4002-37-02 06:17:00* 



             Test Item    Value        Reference Range Interpretation Comments

 

             Glucose Level (test code = SYA4913) 93                       

         





[facility] CenterCalcium Owcav5019-28-78 06:17:00* 



             Test Item    Value        Reference Range Interpretation Comments

 

             Calcium Level (test code = 74488-6) 9.0          8.4-10.2          

         





[facility] Vlfzn3004-35-22 06:17:00* 



             Test Item    Value        Reference Range Interpretation Comments

 

             Sodium Level (test code = 2951-2) 145          136-145             

       





[facility] CenterPotassium Wlpvr1957-52-84 06:17:00* 



             Test Item    Value        Reference Range Interpretation Comments

 

             Potassium Level (test code = 2823-3) 3.6          3.5-5.1          

          





[facility] CenterChloride Kwprw0373-00-63 06:17:00* 



             Test Item    Value        Reference Range Interpretation Comments

 

             Chloride Level (test code = 2075-0) 107                      

         





[facility] CenterCarbon Dioxide Ljxdi8642-33-82 06:17:00* 



             Test Item    Value        Reference Range Interpretation Comments

 

             Carbon Dioxide Level (test code = 2028-9) 29           22-29       

               





[facility] CenterAnion Vtc2915-25-58 06:17:00* 



             Test Item    Value        Reference Range Interpretation Comments

 

             Anion Gap (test code = 33037-3) 12.6         8-16                  

     





[facility] CenterBlood Urea Ibrnhdpq3071-34-63 06:17:00* 



             Test Item    Value        Reference Range Interpretation Comments

 

             Blood Urea Nitrogen (test code = 3094-0) 23           7-26         

              





[facility] CenterCreatinine2019-08-09 06:17:00* 



             Test Item    Value        Reference Range Interpretation Comments

 

             Creatinine (test code = 2160-0) 1.27         0.57-1.11    H        

     





[facility] CenterBUN/Creatinine Dxcic5161-27-97 06:17:00* 



             Test Item    Value        Reference Range Interpretation Comments

 

             BUN/Creatinine Ratio (test code = 3097-3) 18           6-25        

               





[facility] CenterEstimat Glomerular Filtration Rate
2019 06:17:00* 



             Test Item    Value        Reference Range Interpretation Comments

 

             Estimat Glomerular Filtration Rate (test code = 366040055) 40      

     >60          L             





Ranges were taken from the National Kidney Disease Education Program and the [facility] Kidney Foundation literature.Reference ranges:60 or greater: Hsxxcw75-33 (
for 3 consecutive months): Chronic kidney disease 15 or less: Kidney failureHereford Regional Medical CenterGlucose Jnmdt0060-82-80 06:17:00* 



             Test Item    Value        Reference Range Interpretation Comments

 

             Glucose Level (test code = UKK9094) 93                       

         





[facility] CenterCalcium Fflhq7000-02-23 06:17:00* 



             Test Item    Value        Reference Range Interpretation Comments

 

             Calcium Level (test code = 16887-8) 9.0          8.4-10.2          

         





[facility] CenterWhite Blood Acivp7572-78-93 06:11:00* 



             Test Item    Value        Reference Range Interpretation Comments

 

             White Blood Count (test code = 6690-2) 7.31         4.8-10.8       

            





[facility] CenterRed Blood Osjcc9714-94-84 06:11:00* 



             Test Item    Value        Reference Range Interpretation Comments

 

             Red Blood Count (test code = 789-8) 3.62         3.6-5.1           

         





[facility] CenterHemoglobin2019-08-09 06:11:00* 



             Test Item    Value        Reference Range Interpretation Comments

 

             Hemoglobin (test code = 61623-0) 10.7         12.0-16.0    L       

      





[facility] CenterHematocrit2019-08-09 06:11:00* 



             Test Item    Value        Reference Range Interpretation Comments

 

             Hematocrit (test code = 4544-3) 34.3         34.2-44.1             

     





[facility] CenterMean Corpuscular Actwtl4140-97-47 
06:11:00* 



             Test Item    Value        Reference Range Interpretation Comments

 

             Mean Corpuscular Volume (test code = 787-2) 94.8         81-99     

                 





[facility] CenterMean Corpuscular Kneatnmfyr8385-79-57 
06:11:00* 



             Test Item    Value        Reference Range Interpretation Comments

 

             Mean Corpuscular Hemoglobin (test code = 785-6) 29.6         28-32 

                     





[facility] CenterMean Corpuscular Hemoglobin Concent
2019 06:11:00* 



             Test Item    Value        Reference Range Interpretation Comments

 

             Mean Corpuscular Hemoglobin Concent (test code = 786-4) 31.2       

  31-35                      





[facility] CenterRed Cell Distribution Nypai5853-32-76 
06:11:00* 



             Test Item    Value        Reference Range Interpretation Comments

 

             Red Cell Distribution Width (test code = 22436-6) 13.8         11.7

-14.4                  





[facility] CenterPlatelet Okull0810-06-30 06:11:00* 



             Test Item    Value        Reference Range Interpretation Comments

 

             Platelet Count (test code = 777-3) 275          140-360            

        





[facility] CenterNeutrophils (%) (Auto)2019 06:11:00
  * 



             Test Item    Value        Reference Range Interpretation Comments

 

             Neutrophils (%) (Auto) (test code = 95420-0) 55.5         38.7-80.0

                  





[facility] CenterLymphocytes (%) (Auto)2019 06:11:00
  * 



             Test Item    Value        Reference Range Interpretation Comments

 

             Lymphocytes (%) (Auto) (test code = 736-9) 29.8         18.0-39.1  

                





[facility] CenterMonocytes (%) (Auto)2019 06:11:00* 



             Test Item    Value        Reference Range Interpretation Comments

 

             Monocytes (%) (Auto) (test code = 5905-5) 9.0          4.4-11.3    

               





[facility] CenterEosinophils (%) (Auto)2019 06:11:00
  * 



             Test Item    Value        Reference Range Interpretation Comments

 

             Eosinophils (%) (Auto) (test code = 713-8) 4.7          0.0-6.0    

                





[facility] CenterBasophils (%) (Auto)2019 06:11:00* 



             Test Item    Value        Reference Range Interpretation Comments

 

             Basophils (%) (Auto) (test code = 706-2) 0.7          0.0-1.0      

              





[facility] CenterIM GRANULOCYTES %2019 06:11:00* 



             Test Item    Value        Reference Range Interpretation Comments

 

             IM GRANULOCYTES % (test code = IM GRANULOCYTES %) 0.3          0.0-

1.0                    





[facility] CenterNeutrophils # (Auto)2019 06:11:00* 



             Test Item    Value        Reference Range Interpretation Comments

 

             Neutrophils # (Auto) (test code = 751-8) 4.1          2.1-6.9      

              





[facility] CenterLymphocytes # (Auto)2019 06:11:00* 



             Test Item    Value        Reference Range Interpretation Comments

 

             Lymphocytes # (Auto) (test code = 13668-1) 2.2          1.0-3.2    

                





[facility] CenterMonocytes # (Auto)2019 06:11:00* 



             Test Item    Value        Reference Range Interpretation Comments

 

             Monocytes # (Auto) (test code = 742-7) 0.7          0.2-0.8        

            





[facility] CenterEosinophils # (Auto)2019 06:11:00* 



             Test Item    Value        Reference Range Interpretation Comments

 

             Eosinophils # (Auto) (test code = 711-2) 0.3          0.0-0.4      

              





[facility] CenterBasophils # (Auto)2019 06:11:00* 



             Test Item    Value        Reference Range Interpretation Comments

 

             Basophils # (Auto) (test code = 704-7) 0.1          0.0-0.1        

            





[facility] CenterAbsolute Immature Granulocyte (auto
2019 06:11:00* 



             Test Item    Value        Reference Range Interpretation Comments

 

                                        Absolute Immature Granulocyte (auto (zeus

t code = Absolute Immature Granulocyte 

(auto)          0.02            0-0.1                            





[facility] CenterWhite Blood Hffii1979-80-35 06:11:00* 



             Test Item    Value        Reference Range Interpretation Comments

 

             White Blood Count (test code = 6690-2) 7.31         4.8-10.8       

            





[facility] CenterRed Blood Nxfqv3461-75-08 06:11:00* 



             Test Item    Value        Reference Range Interpretation Comments

 

             Red Blood Count (test code = 789-8) 3.62         3.6-5.1           

         





[facility] CenterHemoglobin2019-08-09 06:11:00* 



             Test Item    Value        Reference Range Interpretation Comments

 

             Hemoglobin (test code = 22733-2) 10.7         12.0-16.0    L       

      





[facility] CenterHematocrit2019-08-09 06:11:00* 



             Test Item    Value        Reference Range Interpretation Comments

 

             Hematocrit (test code = 4544-3) 34.3         34.2-44.1             

     





[facility] CenterMean Corpuscular Mqkhfl1541-46-06 
06:11:00* 



             Test Item    Value        Reference Range Interpretation Comments

 

             Mean Corpuscular Volume (test code = 787-2) 94.8         81-99     

                 





[facility] CenterMean Corpuscular Weugmqoqla6788-92-40 
06:11:00* 



             Test Item    Value        Reference Range Interpretation Comments

 

             Mean Corpuscular Hemoglobin (test code = 785-6) 29.6         28-32 

                     





[facility] CenterMean Corpuscular Hemoglobin Concent
2019 06:11:00* 



             Test Item    Value        Reference Range Interpretation Comments

 

             Mean Corpuscular Hemoglobin Concent (test code = 786-4) 31.2       

  31-35                      





[facility] CenterRed Cell Distribution Hejpr4706-05-50 
06:11:00* 



             Test Item    Value        Reference Range Interpretation Comments

 

             Red Cell Distribution Width (test code = 70545-2) 13.8         11.7

-14.4                  





[facility] CenterPlatelet Ruelh1306-94-61 06:11:00* 



             Test Item    Value        Reference Range Interpretation Comments

 

             Platelet Count (test code = 777-3) 275          140-360            

        





[facility] CenterNeutrophils (%) (Auto)2019 06:11:00
  * 



             Test Item    Value        Reference Range Interpretation Comments

 

             Neutrophils (%) (Auto) (test code = 90964-5) 55.5         38.7-80.0

                  





[facility] CenterLymphocytes (%) (Auto)2019 06:11:00
  * 



             Test Item    Value        Reference Range Interpretation Comments

 

             Lymphocytes (%) (Auto) (test code = 736-9) 29.8         18.0-39.1  

                





[facility] CenterMonocytes (%) (Auto)2019 06:11:00* 



             Test Item    Value        Reference Range Interpretation Comments

 

             Monocytes (%) (Auto) (test code = 5905-5) 9.0          4.4-11.3    

               





[facility] CenterEosinophils (%) (Auto)2019 06:11:00
  * 



             Test Item    Value        Reference Range Interpretation Comments

 

             Eosinophils (%) (Auto) (test code = 713-8) 4.7          0.0-6.0    

                





[facility] CenterBasophils (%) (Auto)2019 06:11:00* 



             Test Item    Value        Reference Range Interpretation Comments

 

             Basophils (%) (Auto) (test code = 706-2) 0.7          0.0-1.0      

              





[facility] CenterIM GRANULOCYTES %2019 06:11:00* 



             Test Item    Value        Reference Range Interpretation Comments

 

             IM GRANULOCYTES % (test code = IM GRANULOCYTES %) 0.3          0.0-

1.0                    





[facility] CenterNe[facility]ils [facility] 06:11[facility]00* 



             Test Item    Value        Reference Range Interpretation Comments

 

             Neutrophils # (Auto) (test code = 751-8) 4.1          2.1-6.9      

              





[facility] CenterLymphocytes # (Auto)[facility] 06:11:00* 



             Test Item    Value        Reference Range Interpretation Comments

 

             Lymphocytes # (Auto) (test code = 40327-0) 2.2          1.0-3.2    

                





[facility] CenterMonocytes # (Auto)2019 06:11:00* 



             Test Item    Value        Reference Range Interpretation Comments

 

             Monocytes # (Auto) (test code = 742-7) 0.7          0.2-0.8        

            





[facility] CenterEosinophils [facility] (Auto)[facility] 06:11:00* 



             Test Item    Value        Reference Range Interpretation Comments

 

             Eosinophils # (Auto) (test code = 711-2) 0.3          0.0-0.4      

              





[facility] CenterBasophils # (Auto)2019 06:11:00* 



             Test Item    Value        Reference Range Interpretation Comments

 

             Basophils # (Auto) (test code = 704-7) 0.1          0.0-0.1        

            





[facility] CenterAbsolute Immature Granulocyte (auto
2019 06:11:00* 



             Test Item    Value        Reference Range Interpretation Comments

 

                                        Absolute Immature Granulocyte (auto (zeus

t code = Absolute Immature Granulocyte 

(auto)          0.02            0-0.1                            





[facility] CenterWhite Blood Pqand8631-61-23 06:11:00* 



             Test Item    Value        Reference Range Interpretation Comments

 

             White Blood Count (test code = 6690-2) 7.31         4.8-10.8       

            





[facility] CenterRed Blood Anmei0600-06-41 06:11:00* 



             Test Item    Value        Reference Range Interpretation Comments

 

             Red Blood Count (test code = 789-8) 3.62         3.6-5.1           

         





[facility] CenterHemoglobin2019-08-09 06:11:00* 



             Test Item    Value        Reference Range Interpretation Comments

 

             Hemoglobin (test code = 54178-9) 10.7         12.0-16.0    L       

      





[facility] CenterHematocrit2019-08-09 06:11:00* 



             Test Item    Value        Reference Range Interpretation Comments

 

             Hematocrit (test code = 4544-3) 34.3         34.2-44.1             

     





[facility] CenterMean Corpuscular Vvkevr2020-32-66 
06:11:00* 



             Test Item    Value        Reference Range Interpretation Comments

 

             Mean Corpuscular Volume (test code = 787-2) 94.8         81-99     

                 





[facility] CenterMean Corpuscular Hkhardqerk4151-25-32 
06:11:00* 



             Test Item    Value        Reference Range Interpretation Comments

 

             Mean Corpuscular Hemoglobin (test code = 785-6) 29.6         28-32 

                     





[facility] CenterMean Corpuscular Hemoglobin Concent
2019 06:11:00* 



             Test Item    Value        Reference Range Interpretation Comments

 

             Mean Corpuscular Hemoglobin Concent (test code = 786-4) 31.2       

  31-35                      





[facility] CenterRed Cell Distribution Kicjq9738-68-28 
06:11:00* 



             Test Item    Value        Reference Range Interpretation Comments

 

             Red Cell Distribution Width (test code = 41752-6) 13.8         11.7

-14.4                  





[facility] CenterPlatelet Rmydt1257-29-11 06:11:00* 



             Test Item    Value        Reference Range Interpretation Comments

 

             Platelet Count (test code = 777-3) 925 934-206            

        





[facility] CenterNeutrophils (%) (Auto)2019 06:11:00
  * 



             Test Item    Value        Reference Range Interpretation Comments

 

             Neutrophils (%) (Auto) (test code = 20820-3) 55.5         38.7-80.0

                  





[facility] CenterLymphocytes (%) (Auto)2019 06:11:00
  * 



             Test Item    Value        Reference Range Interpretation Comments

 

             Lymphocytes (%) (Auto) (test code = 736-9) 29.8         18.0-39.1  

                





[facility] CenterMonocytes (%) (Auto)2019 06:11:00* 



             Test Item    Value        Reference Range Interpretation Comments

 

             Monocytes (%) (Auto) (test code = 5905-5) 9.0          4.4-11.3    

               





[facility] CenterEosinophils (%) (Auto)2019 06:11:00
  * 



             Test Item    Value        Reference Range Interpretation Comments

 

             Eosinophils (%) (Auto) (test code = 713-8) 4.7          0.0-6.0    

                





[facility] CenterBasophils (%) (Auto)2019 06:11:00* 



             Test Item    Value        Reference Range Interpretation Comments

 

             Basophils (%) (Auto) (test code = 706-2) 0.7          0.0-1.0      

              





[facility] CenterIM GRANULOCYTES %2019 06:11:00* 



             Test Item    Value        Reference Range Interpretation Comments

 

             IM GRANULOCYTES % (test code = IM GRANULOCYTES %) 0.3          0.0-

1.0                    





[facility] CenterNeutrophils # (Auto)2019 06:11:00* 



             Test Item    Value        Reference Range Interpretation Comments

 

             Neutrophils # (Auto) (test code = 751-8) 4.1          2.1-6.9      

              





[facility] CenterLymphocytes # (Auto)2019 06:11:00* 



             Test Item    Value        Reference Range Interpretation Comments

 

             Lymphocytes # (Auto) (test code = 13832-7) 2.2          1.0-3.2    

                





[facility] CenterMonocytes # (Auto)2019 06:11:00* 



             Test Item    Value        Reference Range Interpretation Comments

 

             Monocytes # (Auto) (test code = 742-7) 0.7          0.2-0.8        

            





[facility] CenterEosinophils # (Auto)2019 06:11:00* 



             Test Item    Value        Reference Range Interpretation Comments

 

             Eosinophils # (Auto) (test code = 711-2) 0.3          0.0-0.4      

              





[facility] CenterBasophils # (Auto)2019 06:11:00* 



             Test Item    Value        Reference Range Interpretation Comments

 

             Basophils # (Auto) (test code = 704-7) 0.1          0.0-0.1        

            





[facility] CenterAbsolute Immature Granulocyte (auto
2019 06:11:00* 



             Test Item    Value        Reference Range Interpretation Comments

 

                                        Absolute Immature Granulocyte (auto (zeus

t code = Absolute Immature Granulocyte 

(auto)          0.02            0-0.1                            





[facility] CenterWhite Blood Muxyp1877-93-65 06:11:00* 



             Test Item    Value        Reference Range Interpretation Comments

 

             White Blood Count (test code = 6690-2) 7.31         4.8-10.8       

            





[facility] CenterRed Blood Zdoiw4483-46-78 06:11:00* 



             Test Item    Value        Reference Range Interpretation Comments

 

             Red Blood Count (test code = 789-8) 3.62         3.6-5.1           

         





[facility] CenterHemoglobin2019-08-09 06:11:00* 



             Test Item    Value        Reference Range Interpretation Comments

 

             Hemoglobin (test code = 56387-8) 10.7         12.0-16.0    L       

      





[facility] CenterHematocrit2019-08-09 06:11:00* 



             Test Item    Value        Reference Range Interpretation Comments

 

             Hematocrit (test code = 4544-3) 34.3         34.2-44.1             

     





[facility] CenterMean Corpuscular Azovzy3926-88-11 
06:11:00* 



             Test Item    Value        Reference Range Interpretation Comments

 

             Mean Corpuscular Volume (test code = 787-2) 94.8         81-99     

                 





[facility] CenterMean Corpuscular Abkvdmulht8610-14-72 
06:11:00* 



             Test Item    Value        Reference Range Interpretation Comments

 

             Mean Corpuscular Hemoglobin (test code = 785-6) 29.6         28-32 

                     





[facility] CenterMean Corpuscular Hemoglobin Concent
2019 06:11:00* 



             Test Item    Value        Reference Range Interpretation Comments

 

             Mean Corpuscular Hemoglobin Concent (test code = 786-4) 31.2       

  31-35                      





[facility] CenterRed Cell Distribution Yrlpd8707-68-26 
06:11:00* 



             Test Item    Value        Reference Range Interpretation Comments

 

             Red Cell Distribution Width (test code = 37900-6) 13.8         11.7

-14.4                  





[facility] CenterPlatelet Wontj0010-96-16 06:11:00* 



             Test Item    Value        Reference Range Interpretation Comments

 

             Platelet Count (test code = 777-3) 275          140-360            

        





[facility] CenterNeutrophils (%) (Auto)2019 06:11:00
  * 



             Test Item    Value        Reference Range Interpretation Comments

 

             Neutrophils (%) (Auto) (test code = 91813-9) 55.5         38.7-80.0

                  





[facility] CenterLymphocytes (%) (Auto)2019 06:11:00
  * 



             Test Item    Value        Reference Range Interpretation Comments

 

             Lymphocytes (%) (Auto) (test code = 736-9) 29.8         18.0-39.1  

                





[facility] CenterMonocytes (%) (Auto)2019 06:11:00* 



             Test Item    Value        Reference Range Interpretation Comments

 

             Monocytes (%) (Auto) (test code = 5905-5) 9.0          4.4-11.3    

               





[facility] CenterEosinophils (%) (Auto)2019 06:11:00
  * 



             Test Item    Value        Reference Range Interpretation Comments

 

             Eosinophils (%) (Auto) (test code = 713-8) 4.7          0.0-6.0    

                





[facility] CenterBasophils (%) (Auto)2019 06:11:00* 



             Test Item    Value        Reference Range Interpretation Comments

 

             Basophils (%) (Auto) (test code = 706-2) 0.7          0.0-1.0      

              





[facility] CenterIM GRANULOCYTES %2019 06:11:00* 



             Test Item    Value        Reference Range Interpretation Comments

 

             IM GRANULOCYTES % (test code = IM GRANULOCYTES %) 0.3          0.0-

1.0                    





[facility] CenterNeutrophils # (Auto)2019 06:11:00* 



             Test Item    Value        Reference Range Interpretation Comments

 

             Neutrophils # (Auto) (test code = 751-8) 4.1          2.1-6.9      

              





[facility] CenterLymphocytes # (Auto)2019 06:11:00* 



             Test Item    Value        Reference Range Interpretation Comments

 

             Lymphocytes # (Auto) (test code = 29236-0) 2.2          1.0-3.2    

                





[facility] CenterMonocytes # (Auto)2019 06:11:00* 



             Test Item    Value        Reference Range Interpretation Comments

 

             Monocytes # (Auto) (test code = 742-7) 0.7          0.2-0.8        

            





[facility] CenterEosinophils # (Auto)2019 06:11:00* 



             Test Item    Value        Reference Range Interpretation Comments

 

             Eosinophils # (Auto) (test code = 711-2) 0.3          0.0-0.4      

              





[facility] CenterBasophils # (Auto)2019 06:11:00* 



             Test Item    Value        Reference Range Interpretation Comments

 

             Basophils # (Auto) (test code = 704-7) 0.1          0.0-0.1        

            





[facility] CenterAbsolute Immature Granulocyte (auto
2019 06:11:00* 



             Test Item    Value        Reference Range Interpretation Comments

 

                                        Absolute Immature Granulocyte (auto (zeus

t code = Absolute Immature Granulocyte 

(auto)          0.02            0-0.1                            





[facility] CenterCreatine Kinase [id number] 14:50:00* 



             Test Item    Value        Reference Range Interpretation Comments

 

             Creatine Kinase MB (test code = 22406-0) 5.10         0-5.0        

H             





[facility] CenterTroponin -40-95 14:50:00* 



             Test Item    Value        Reference Range Interpretation Comments

 

             Troponin I (test code = ZHW1162) 0.029        0-0.300              

      





[facility] CenterCreatine Kinase [id number] 14:50:00* 



             Test Item    Value        Reference Range Interpretation Comments

 

             Creatine Kinase MB (test code = 14120-5) 5.10         0-5.0        

H             





[place] 04527019-08-08 14:50:00* 



             Test Item    Value        Reference Range Interpretation Comments

 

             Troponin I (test code = IMA1576) 0.029        0-0.300              

      





[facility] CenterCreatine Kinase RY5445-72-47 14:50:00* 



             Test Item    Value        Reference Range Interpretation Comments

 

             Creatine Kinase MB (test code = 35685-1) 5.10         0-5.0        

H             





[place] 04527019-08-08 14:50:00* 



             Test Item    Value        Reference Range Interpretation Comments

 

             Troponin I (test code = OKH2435) 0.029        0-0.300              

      





[facility] CenterCreatine Kinase PR8285-42-02 14:50:00* 



             Test Item    Value        Reference Range Interpretation Comments

 

             Creatine Kinase MB (test code = 18061-2) 5.10         0-5.0        

H             





[place] 04527019-08-08 14:50:00* 



             Test Item    Value        Reference Range Interpretation Comments

 

             Troponin I (test code = UKM9469) 0.029        0-0.300              

      





[facility] CenterCreatine Fbvlqu3173-54-80 14:36:00* 



             Test Item    Value        Reference Range Interpretation Comments

 

             Creatine Kinase (test code = 2157-6) 104                     

          





[facility] CenterCreatine Dlwflg3825-53-19 14:36:00* 



             Test Item    Value        Reference Range Interpretation Comments

 

             Creatine Kinase (test code = 2157-6) 104                     

          





[facility] CenterCreatine Lernfm3199-44-47 14:36:00* 



             Test Item    Value        Reference Range Interpretation Comments

 

             Creatine Kinase (test code = 2157-6) 104                     

          





[facility] CenterCreatine Ngcjwt0909-95-23 14:36:00* 



             Test Item    Value        Reference Range Interpretation Comments

 

             Creatine Kinase (test code = 2157-6) 104                     

          





[facility] CenterTotal Cxslppmms2773-74-61 06:43:00* 



             Test Item    Value        Reference Range Interpretation Comments

 

             Total Bilirubin (test code = 1975-2) 0.2          0.2-1.2          

          





[facility] CenterAspartate Amino Transf (AST/SGOT)
2019 06:43:00* 



             Test Item    Value        Reference Range Interpretation Comments

 

                                        Aspartate Amino Transf (AST/SGOT) (test 

code = Aspartate Amino Transf 

(AST/SGOT))     16              5-34                             





[facility] CenterAlanine Aminotransferase (ALT/SGPT)
2019 06:43:00* 



             Test Item    Value        Reference Range Interpretation Comments

 

             Alanine Aminotransferase (ALT/SGPT) (test code = 1742-6) 10        

   0-55                       





[facility] CenterTotal Ffrlyqd8643-02-39 06:43:00* 



             Test Item    Value        Reference Range Interpretation Comments

 

             Total Protein (test code = 2885-2) 5.7          6.5-8.1      L     

        





[facility] CenterAlbumin2019-08-08 06:43:00* 



             Test Item    Value        Reference Range Interpretation Comments

 

             Albumin (test code = 1751-7) 3.1          3.5-5.0      L           

  





[facility] CenterGlobulin2019-08-08 06:43:00* 



             Test Item    Value        Reference Range Interpretation Comments

 

             Globulin (test code = 12213-9) 2.6          2.3-3.5                

    





[facility] CenterAlbumin/Globulin Khjvx3274-91-77 06:43:00
  * 



             Test Item    Value        Reference Range Interpretation Comments

 

             Albumin/Globulin Ratio (test code = 1759-0) 1.2          0.8-2.0   

                 





[facility] CenterAlkaline Fzdptgxoyik3855-12-98 06:43:00* 



             Test Item    Value        Reference Range Interpretation Comments

 

             Alkaline Phosphatase (test code = 6768-6) 34                 

 L             





[facility] CenterTriglycerides Dbbid3213-12-17 06:43:00* 



             Test Item    Value        Reference Range Interpretation Comments

 

             Triglycerides Level (test code = 2571-8) 186          0-149        

H             





[facility] CenterCholesterol Omblc1009-04-74 06:43:00* 



             Test Item    Value        Reference Range Interpretation Comments

 

             Cholesterol Level (test code = 2093-3) 149          0-199          

            





Less than 200 mg/dL       Low Rpfg739 - 239 mg/dL           Borderline Slpk606 m
g/dl and greater     High Risk                        [facility] CenterLDL Qvnonmpiasa4431-87-04 06:43:00* 



             Test Item    Value        Reference Range Interpretation Comments

 

             LDL Cholesterol (test code = 2089-1) 70                      

          





[facility] CenterHDL Iccmyrjtdmk4759-38-74 06:43:00* 



             Test Item    Value        Reference Range Interpretation Comments

 

             HDL Cholesterol (test code = 2085-9) 42           40-60            

          





[facility] CenterCholesterol/HDL Eeogy2517-13-18 06:43:00
  * 



             Test Item    Value        Reference Range Interpretation Comments

 

             Cholesterol/HDL Ratio (test code = 9830-1) 3.5          3.0-3.6    

                





[facility] CenterTotal Qogpnbqho6339-46-87 06:43:00* 



             Test Item    Value        Reference Range Interpretation Comments

 

             Total Bilirubin (test code = 1975-2) 0.2          0.2-1.2          

          





[facility] CenterAspartate Amino Transf (AST/SGOT)
2019 06:43:00* 



             Test Item    Value        Reference Range Interpretation Comments

 

                                        Aspartate Amino Transf (AST/SGOT) (test 

code = Aspartate Amino Transf 

(AST/SGOT))     16              5-34                             





[facility] CenterAlanine Aminotransferase (ALT/SGPT)
2019 06:43:00* 



             Test Item    Value        Reference Range Interpretation Comments

 

             Alanine Aminotransferase (ALT/SGPT) (test code = 1742-6) 10        

   0-55                       





[facility] CenterTotal Rspnbpi8007-77-19 06:43:00* 



             Test Item    Value        Reference Range Interpretation Comments

 

             Total Protein (test code = 2885-2) 5.7          6.5-8.1      L     

        





[facility] CenterAlbumin2019-08-08 06:43:00* 



             Test Item    Value        Reference Range Interpretation Comments

 

             Albumin (test code = 1751-7) 3.1          3.5-5.0      L           

  





[facility] CenterGlobulin2019-08-08 06:43:00* 



             Test Item    Value        Reference Range Interpretation Comments

 

             Globulin (test code = 11796-6) 2.6          2.3-3.5                

    





[facility] CenterAlbumin/Globulin Yiwqh2928-67-49 06:43:00
  * 



             Test Item    Value        Reference Range Interpretation Comments

 

             Albumin/Globulin Ratio (test code = 1759-0) 1.2          0.8-2.0   

                 





[facility] CenterAlkaline Lvyzhcjpfoh7959-36-56 06:43:00* 



             Test Item    Value        Reference Range Interpretation Comments

 

             Alkaline Phosphatase (test code = 6768-6) 34                 

 L             





[facility] CenterTriglycerides Zkxep0514-65-12 06:43:00* 



             Test Item    Value        Reference Range Interpretation Comments

 

             Triglycerides Level (test code = 2571-8) 186          0-149        

H             





[facility] CenterCholesterol Zfpsw6627-91-20 06:43:00* 



             Test Item    Value        Reference Range Interpretation Comments

 

             Cholesterol Level (test code = 2093-3) 149          0-199          

            





Less than 200 mg/dL       Low Kwmx167 - 239 mg/dL           Borderline Roel662 m
g/dl and greater     High Risk                        [facility] CenterLDL Jkssgxdzazd7977-17-03 06:43:00* 



             Test Item    Value        Reference Range Interpretation Comments

 

             LDL Cholesterol (test code = 2089-1) 70                      

          





[facility] CenterHDL Lgdjhkmyqsq0495-60-40 06:43:00* 



             Test Item    Value        Reference Range Interpretation Comments

 

             HDL Cholesterol (test code = 2085-9) 42           40-60            

          





[facility] CenterCholesterol/HDL Fgdkc5746-54-40 06:43:00
  * 



             Test Item    Value        Reference Range Interpretation Comments

 

             Cholesterol/HDL Ratio (test code = 9830-1) 3.5          3.0-3.6    

                





[facility] CenterTotal Ehylwqadc9354-37-19 06:43:00* 



             Test Item    Value        Reference Range Interpretation Comments

 

             Total Bilirubin (test code = 1975-2) 0.2          0.2-1.2          

          





[facility] CenterAspartate Amino Transf (AST/SGOT)
2019 06:43:00* 



             Test Item    Value        Reference Range Interpretation Comments

 

                                        Aspartate Amino Transf (AST/SGOT) (test 

code = Aspartate Amino Transf 

(AST/SGOT))     16              5-34                             





[facility] CenterAlanine Aminotransferase (ALT/SGPT)
2019 06:43:00* 



             Test Item    Value        Reference Range Interpretation Comments

 

             Alanine Aminotransferase (ALT/SGPT) (test code = 1742-6) 10        

   0-55                       





[facility] CenterTotal Kqiwbyg8615-63-98 06:43:00* 



             Test Item    Value        Reference Range Interpretation Comments

 

             Total Protein (test code = 2885-2) 5.7          6.5-8.1      L     

        





[facility] CenterAlbumin2019-08-08 06:43:00* 



             Test Item    Value        Reference Range Interpretation Comments

 

             Albumin (test code = 1751-7) 3.1          3.5-5.0      L           

  





[facility] CenterGlobulin2019-08-08 06:43:00* 



             Test Item    Value        Reference Range Interpretation Comments

 

             Globulin (test code = 39354-2) 2.6          2.3-3.5                

    





[facility] CenterAlbumin/Globulin Xgral4565-15-51 06:43:00
  * 



             Test Item    Value        Reference Range Interpretation Comments

 

             Albumin/Globulin Ratio (test code = 1759-0) 1.2          0.8-2.0   

                 





[facility] Isetfwavqse3572-75-67 06:43:00* 



             Test Item    Value        Reference Range Interpretation Comments

 

             Alkaline Phosphatase (test code = 6768-6) 34                 

 L             





[facility] CenterTriglycerides Xbsrs7353-76-42 06:43:00* 



             Test Item    Value        Reference Range Interpretation Comments

 

             Triglycerides Level (test code = 2571-8) 186          0-149        

H             





[facility] CenterCholesterol Zlzzx4745-43-41 06:43:00* 



             Test Item    Value        Reference Range Interpretation Comments

 

             Cholesterol Level (test code = 2093-3) 149          0-199          

            





Less than 200 mg/dL       Low Ruhw927 - 239 mg/dL           Borderline Ydpd315 m
g/dl and greater     High Risk                        [facility] CenterLDL Eolehrkjnqf9124-09-70 06:43:00* 



             Test Item    Value        Reference Range Interpretation Comments

 

             LDL Cholesterol (test code = 2089-1) 70                      

          





[facility]HDL Tnxzukqqnil3526-21-28 06:43:00* 



             Test Item    Value        Reference Range Interpretation Comments

 

             HDL Cholesterol (test code = 2085-9) 42           40-60            

          





[facility]/HDL Bzwqe3202-84-86 06:43:00
  * 



             Test Item    Value        Reference Range Interpretation Comments

 

             Cholesterol/HDL Ratio (test code = 9830-1) 3.5          3.0-3.6    

                





[facility] CenterTotal Nvkkeursz4463-43-80 06:43:00* 



             Test Item    Value        Reference Range Interpretation Comments

 

             Total Bilirubin (test code = 1975-2) 0.2          0.2-1.2          

          





[facility] CenterAspartate Amino Transf (AST/SGOT)
2019 06:43:00* 



             Test Item    Value        Reference Range Interpretation Comments

 

                                        Aspartate Amino Transf (AST/SGOT) (test 

code = Aspartate Amino Transf 

(AST/SGOT))     16              5-34                             





[facility] CenterAlanine Aminotransferase (ALT/SGPT)
2019 06:43:00* 



             Test Item    Value        Reference Range Interpretation Comments

 

             Alanine Aminotransferase (ALT/SGPT) (test code = 1742-6) 10        

   0-55                       





[facility] CenterTotal Inkvhox2329-81-25 06:43:00* 



             Test Item    Value        Reference Range Interpretation Comments

 

             Total Protein (test code = 2885-2) 5.7          6.5-8.1      L     

        





[facility] CenterAlbumin2019-08-08 06:43:00* 



             Test Item    Value        Reference Range Interpretation Comments

 

             Albumin (test code = 1751-7) 3.1          3.5-5.0      L           

  





[facility] CenterGlobulin2019-08-08 06:43:00* 



             Test Item    Value        Reference Range Interpretation Comments

 

             Globulin (test code = 55817-8) 2.6          2.3-3.5                

    





[facility] CenterAlbumin/Globulin Hilnq5873-55-82 06:43:00
  * 



             Test Item    Value        Reference Range Interpretation Comments

 

             Albumin/Globulin Ratio (test code = 1759-0) 1.2          0.8-2.0   

                 





[facility] CenterAlkaline Khifrrqijmb5783-68-20 06:43:00* 



             Test Item    Value        Reference Range Interpretation Comments

 

             Alkaline Phosphatase (test code = 6768-6) 34                 

 L             





[facility] CenterTriglycerides Vhbgq3888-95-77 06:43:00* 



             Test Item    Value        Reference Range Interpretation Comments

 

             Triglycerides Level (test code = 2571-8) 186          0-149        

H             





[facility] CenterCholesterol Jldij0123-58-12 06:43:00* 



             Test Item    Value        Reference Range Interpretation Comments

 

             Cholesterol Level (test code = 2093-3) 149          0-199          

            





Less than 200 mg/dL       Low Eevi992 - 239 mg/dL           Borderline Jews249 m
g/dl and greater     High Risk                        [facility] CenterLDL Idftrrjucac9497-86-54 06:43:00* 



             Test Item    Value        Reference Range Interpretation Comments

 

             LDL Cholesterol (test code = 2089-1) 70                      

          





[facility] CenterHDL Ztsfesrgwar9958-85-74 06:43:00* 



             Test Item    Value        Reference Range Interpretation Comments

 

             HDL Cholesterol (test code = 2085-9) 42           40-60            

          





[facility] CenterCholesterol/HDL Neskq4849-78-71 06:43:00
  * 



             Test Item    Value        Reference Range Interpretation Comments

 

             Cholesterol/HDL Ratio (test code = 9830-1) 3.5          3.0-3.6    

                





[facility] CenterLactic Acid Lohlx6633-93-65 18:22:00* 



             Test Item    Value        Reference Range Interpretation Comments

 

             Lactic Acid Level (test code = Lactic Acid Level) 7.6          4.5-

19.8                   





[facility] CenterLactic Acid Obatq3723-50-92 18:22:00* 



             Test Item    Value        Reference Range Interpretation Comments

 

             Lactic Acid Level (test code = Lactic Acid Level) 7.6          4.5-

19.8                   





[facility] CenterLactic Acid Dghyf1774-12-09 18:22:00* 



             Test Item    Value        Reference Range Interpretation Comments

 

             Lactic Acid Level (test code = Lactic Acid Level) 7.6          4.5-

19.8                   





[facility] CenterLactic Acid Ukakw4275-53-37 18:22:00* 



             Test Item    Value        Reference Range Interpretation Comments

 

             Lactic Acid Level (test code = Lactic Acid Level) 7.6          4.5-

19.8                   





[facility] CenterUrine JJE7712-68-43 18:04:00* 



             Test Item    Value        Reference Range Interpretation Comments

 

             Urine WBC (test code = 5821-4) NONE         0-5                    

    





[facility] CenterUrine GZZ5558-82-83 18:04:00* 



             Test Item    Value        Reference Range Interpretation Comments

 

             Urine RBC (test code = 32709-9) NONE         0-5                   

     





[facility] CenterUrine Vunjzehj9850-59-12 18:04:00* 



             Test Item    Value        Reference Range Interpretation Comments

 

             Urine Bacteria (test code = 54835-5) NONE         NONE             

          





[facility] Epithelial Hhdik5401-50-57 18:04:00
  * 



             Test Item    Value        Reference Range Interpretation Comments

 

             Urine Epithelial Cells (test code = 47659-3) RARE         NONE     

                  





[facility] Hyaline Hyvrb3906-71-96 18:04:00* 



             Test Item    Value        Reference Range Interpretation Comments

 

             Urine Hyaline Casts (test code = 34043-4) 6-10         0-1         

 H             





[facility] Coarse Granular Nisjk5059-05-73 
18:04:00* 



             Test Item    Value        Reference Range Interpretation Comments

 

             Urine Coarse Granular Casts (test code = 37263-3) 1-5          >0  

         H             





[facility] CenterUrine NRM9980-40-69 18:04:00* 



             Test Item    Value        Reference Range Interpretation Comments

 

             Urine WBC (test code = 5821-4) NONE         0-5                    

    





[facility] CenterU[facility] YQJ7974-69-64 18:04:00* 



             Test Item    Value        Reference Range Interpretation Comments

 

             Urine RBC (test code = 28343-2) NONE         0-5                   

     





[facility] Idflbtgc5171-99-45 18:04:00* 



             Test Item    Value        Reference Range Interpretation Comments

 

             Urine Bacteria (test code = 56129-6) NONE         NONE             

          





[facility] CenterU[facility] Epithelial Kdzmv0858-54-31 18:04:00
  * 



             Test Item    Value        Reference Range Interpretation Comments

 

             Urine Epithelial Cells (test code = 25313-8) RARE         NONE     

                  





[facility] Hyaline Wnnrd5648-47-85 18:04:00* 



             Test Item    Value        Reference Range Interpretation Comments

 

             Urine Hyaline Casts (test code = 44608-2) 6-10         0-1         

 H             





[facility] CenterU[facility] Coarse Granular Ezwuh2182-68-71 
18:04:00* 



             Test Item    Value        Reference Range Interpretation Comments

 

             Urine Coarse Granular Casts (test code = 50637-1) 1-5          >0  

         H             





[facility] CenterU[facility] SOC1814-42-89 18:04:00* 



             Test Item    Value        Reference Range Interpretation Comments

 

             Urine WBC (test code = 5821-4) NONE         0-5                    

    





[facility] CenterU[facility] QRN4993-46-09 18:04:00* 



             Test Item    Value        Reference Range Interpretation Comments

 

             Urine RBC (test code = 08509-6) NONE         0-5                   

     





[facility] CenterU[facility] Bsrzcwqu8696-04-88 18:04:00* 



             Test Item    Value        Reference Range Interpretation Comments

 

             Urine Bacteria (test code = 95452-4) NONE         NONE             

          





[facility] CenterU[facility] Epithelial Evoxb9388-85-10 18:04:00
  * 



             Test Item    Value        Reference Range Interpretation Comments

 

             Urine Epithelial Cells (test code = 90791-5) RARE         NONE     

                  





[facility] Center[facility] Hyaline Zjqqz2965-16-97 18:04:00* 



             Test Item    Value        Reference Range Interpretation Comments

 

             Urine Hyaline Casts (test code = 66749-2) 6-10         0-1         

 H             





[facility] CenterU[facility] Coarse Granular Qzmft6503-49-07 
18:04:00* 



             Test Item    Value        Reference Range Interpretation Comments

 

             Urine Coarse Granular Casts (test code = 70659-1) 1-5          >0  

         H             





[facility] CenterU[facility] ONZ9349-84-32 18:04:00* 



             Test Item    Value        Reference Range Interpretation Comments

 

             Urine WBC (test code = 5821-4) NONE         0-5                    

    





[facility] CenterU[facility] QSK7621-31-13 18:04:00* 



             Test Item    Value        Reference Range Interpretation Comments

 

             Urine RBC (test code = 27959-9) NONE         0-5                   

     





[facility] CenterU[facility] Djssclyo1568-90-43 18:04:00* 



             Test Item    Value        Reference Range Interpretation Comments

 

             Urine Bacteria (test code = 56957-9) NONE         NONE             

          





[facility] CenterU[facility] Epithelial Mnlbh8361-46-54 18:04:00
  * 



             Test Item    Value        Reference Range Interpretation Comments

 

             Urine Epithelial Cells (test code = 23323-2) RARE         NONE     

                  





[facility] CenterU[facility] Hyaline Rvccp4925-77-37 18:04:00* 



             Test Item    Value        Reference Range Interpretation Comments

 

             Urine Hyaline Casts (test code = 23513-1) 6-10         0-1         

 H             





[facility] CenterU[facility] Coarse Granular Vvvue0361-81-58 
18:04:00* 



             Test Item    Value        Reference Range Interpretation Comments

 

             Urine Coarse Granular Casts (test code = 49039-3) 1-5          >0  

         H             





[facility] CenterU[facility] Wkxlq1602-31-33 17:56:00* 



             Test Item    Value        Reference Range Interpretation Comments

 

             Urine Color (test code = 5778-6) YELLOW       YELLOW               

      





[facility] CenterU[facility] Uybbxgv0593-24-50 17:56:00* 



             Test Item    Value        Reference Range Interpretation Comments

 

             Urine Clarity (test code = 74709-4) SL CLOUDY    CLEAR             

         





[facility] CenterU[facility] Specific Djpiyhe5633-66-34 17:56:00
  * 



             Test Item    Value        Reference Range Interpretation Comments

 

             Urine Specific Gravity (test code = 5811-5) 1.015        1.010-1.02

5                





[facility] CenterU[facility] gN7877-38-27 17:56:00* 



             Test Item    Value        Reference Range Interpretation Comments

 

             Urine pH (test code = 55605-9) 6            5-7                    

    





[facility] Center[facility] Leukocyte Mhbddqxb5539-31-60 
17:56:00* 



             Test Item    Value        Reference Range Interpretation Comments

 

             Urine Leukocyte Esterase (test code = 54092-4) NEGATIVE     NEGATIV

E                   





[facility] Center[facility] Pyztuvj0422-32-10 17:56:00* 



             Test Item    Value        Reference Range Interpretation Comments

 

             Urine Nitrite (test code = 50187-1) NEGATIVE     NEGATIVE          

         





[facility] Center[facility] Dlwqigz1580-13-97 17:56:00* 



             Test Item    Value        Reference Range Interpretation Comments

 

             Urine Protein (test code = 57735-3) NEGATIVE     NEGATIVE          

         





[facility] Center[facility] Glucose (UA)2019 17:56:00* 



             Test Item    Value        Reference Range Interpretation Comments

 

             Urine Glucose (UA) (test code = 05765-4) NEGATIVE     NEGATIVE     

              





[facility] Center[facility] Ayknndm1938-80-45 17:56:00* 



             Test Item    Value        Reference Range Interpretation Comments

 

             Urine Ketones (test code = 13143-4) NEGATIVE     NEGATIVE          

         





[facility] Center[facility] Simiuowznsqc5981-90-09 17:56:00* 



             Test Item    Value        Reference Range Interpretation Comments

 

             Urine Urobilinogen (test code = 90872-7) 0.2          0.2-1        

              





[facility] Cvrwocuor0420-14-29 17:56:00* 



             Test Item    Value        Reference Range Interpretation Comments

 

             Urine Bilirubin (test code = 1977-8) NEGATIVE     NEGATIVE         

          





[facility] Mkwjx6330-14-66 17:56:00* 



             Test Item    Value        Reference Range Interpretation Comments

 

             Urine Blood (test code = 11739-9) NEGATIVE     NEGATIVE            

       





[facility] Center[facility] Rvgbq2942-51-64 17:56:00* 



             Test Item    Value        Reference Range Interpretation Comments

 

             Urine Color (test code = 5778-6) YELLOW       YELLOW               

      





[facility] CenterU[facility] Gtcnyjb7712-59-24 17:56:00* 



             Test Item    Value        Reference Range Interpretation Comments

 

             Urine Clarity (test code = 50419-5) SL CLOUDY    CLEAR             

         





[facility] CenterU[facility] Specific Tejlhqy3342-22-66 17:56:00
  * 



             Test Item    Value        Reference Range Interpretation Comments

 

             Urine Specific Gravity (test code = 5811-5) 1.015        1.010-1.02

5                





[facility] Center[facility] qJ8085-78-02 17:56:00* 



             Test Item    Value        Reference Range Interpretation Comments

 

             Urine pH (test code = 52888-3) 6            5-7                    

    





[facility] Center[facility] Leukocyte Faixzjdz9227-94-40 
17:56:00* 



             Test Item    Value        Reference Range Interpretation Comments

 

             Urine Leukocyte Esterase (test code = 51250-9) NEGATIVE     NEGATIV

E                   





[facility] Center[facility] Lvnbcdd9338-83-86 17:56:00* 



             Test Item    Value        Reference Range Interpretation Comments

 

             Urine Nitrite (test code = 39711-3) NEGATIVE     NEGATIVE          

         





[facility] Center[facility] Lcsuikd6555-34-32 17:56:00* 



             Test Item    Value        Reference Range Interpretation Comments

 

             Urine Protein (test code = 57735-3) NEGATIVE     NEGATIVE          

         





[facility] Center[facility] Glucose (UA)2019 17:56:00* 



             Test Item    Value        Reference Range Interpretation Comments

 

             Urine Glucose (UA) (test code = 18170-9) NEGATIVE     NEGATIVE     

              





[facility] Lmvxwnj1828-18-17 17:56:00* 



             Test Item    Value        Reference Range Interpretation Comments

 

             Urine Ketones (test code = 73954-3) NEGATIVE     NEGATIVE          

         





[facility] Vzzckfyxhvib2307-85-25 17:56:00* 



             Test Item    Value        Reference Range Interpretation Comments

 

             Urine Urobilinogen (test code = 51461-4) 0.2          0.2-1        

              





[facility] Uhoeugnyy2634-40-08 17:56:00* 



             Test Item    Value        Reference Range Interpretation Comments

 

             Urine Bilirubin (test code = 1977-8) NEGATIVE     NEGATIVE         

          





[facility] Hseoy2261-95-74 17:56:00* 



             Test Item    Value        Reference Range Interpretation Comments

 

             Urine Blood (test code = 40455-5) NEGATIVE     NEGATIVE            

       





[facility] Rlnsk5650-46-77 17:56:00* 



             Test Item    Value        Reference Range Interpretation Comments

 

             Urine Color (test code = 5778-6) YELLOW       YELLOW               

      





[facility] Center[facility] Zdsluwt3211-53-79 17:56:00* 



             Test Item    Value        Reference Range Interpretation Comments

 

             Urine Clarity (test code = 47732-3) SL CLOUDY    CLEAR             

         





[facility] CenterU[facility] Specific Dufknnq3254-86-58 17:56:00
  * 



             Test Item    Value        Reference Range Interpretation Comments

 

             Urine Specific Gravity (test code = 5811-5) 1.015        1.010-1.02

5                





[facility] Center[facility] [id number] 17:56:00* 



             Test Item    Value        Reference Range Interpretation Comments

 

             Urine pH (test code = 25862-9) 6            5-7                    

    





[facility] CenterU[facility] Leukocyte Uhlecsjz5914-52-53 
17:56:00* 



             Test Item    Value        Reference Range Interpretation Comments

 

             Urine Leukocyte Esterase (test code = 86325-7) NEGATIVE     NEGATIV

E                   





[facility] Snmvyha4720-11-12 17:56:00* 



             Test Item    Value        Reference Range Interpretation Comments

 

             Urine Nitrite (test code = 41766-2) NEGATIVE     NEGATIVE          

         





[facility] Jubzkgp2075-88-10 17:56:00* 



             Test Item    Value        Reference Range Interpretation Comments

 

             Urine Protein (test code = 57735-3) NEGATIVE     NEGATIVE          

         





[facility] Glucose (UA)2019 17:56:00* 



             Test Item    Value        Reference Range Interpretation Comments

 

             Urine Glucose (UA) (test code = 29524-2) NEGATIVE     NEGATIVE     

              





[facility] Qdhwelm4338-46-30 17:56:00* 



             Test Item    Value        Reference Range Interpretation Comments

 

             Urine Ketones (test code = 74165-1) NEGATIVE     NEGATIVE          

         





[facility] Eaxoeyjwtkik6426-12-81 17:56:00* 



             Test Item    Value        Reference Range Interpretation Comments

 

             Urine Urobilinogen (test code = 53550-7) 0.2          0.2-1        

              





[facility] Ufgdtzhmc9785-19-82 17:56:00* 



             Test Item    Value        Reference Range Interpretation Comments

 

             Urine Bilirubin (test code = 1977-8) NEGATIVE     NEGATIVE         

          





[facility] Savwn7404-03-87 17:56:00* 



             Test Item    Value        Reference Range Interpretation Comments

 

             Urine Blood (test code = 38177-5) NEGATIVE     NEGATIVE            

       





[facility] Gsajn0457-66-56 17:56:00* 



             Test Item    Value        Reference Range Interpretation Comments

 

             Urine Color (test code = 5778-6) YELLOW       YELLOW               

      





[facility] Center[facility] Fwkiqun7007-86-41 17:56:00* 



             Test Item    Value        Reference Range Interpretation Comments

 

             Urine Clarity (test code = 79371-0) SL CLOUDY    CLEAR             

         





[facility] Center[facility] Specific Gprqtzv9986-74-96 17:56:00
  * 



             Test Item    Value        Reference Range Interpretation Comments

 

             Urine Specific Gravity (test code = 5811-5) 1.015        1.010-1.02

5                





[facility] rQ0426-79-89 17:56:00* 



             Test Item    Value        Reference Range Interpretation Comments

 

             Urine pH (test code = 02004-3) 6            5-7                    

    





[facility] Center[facility] Leukocyte Cmiqnbjz5692-68-90 
17:56:00* 



             Test Item    Value        Reference Range Interpretation Comments

 

             Urine Leukocyte Esterase (test code = 12377-5) NEGATIVE     NEGATIV

E                   





[facility] Fhklplh7723-54-14 17:56:00* 



             Test Item    Value        Reference Range Interpretation Comments

 

             Urine Nitrite (test code = 52837-5) NEGATIVE     NEGATIVE          

         





[facility] Wzaujzh6670-17-08 17:56:00* 



             Test Item    Value        Reference Range Interpretation Comments

 

             Urine Protein (test code = 57735-3) NEGATIVE     NEGATIVE          

         





[facility] Center[facility] Glucose (UA)2019 17:56:00* 



             Test Item    Value        Reference Range Interpretation Comments

 

             Urine Glucose (UA) (test code = 73357-7) NEGATIVE     NEGATIVE     

              





[facility] Tmxlxrb3893-14-45 17:56:00* 



             Test Item    Value        Reference Range Interpretation Comments

 

             Urine Ketones (test code = 30291-6) NEGATIVE     NEGATIVE          

         





[facility] Lhobvvkleshq0768-48-08 17:56:00* 



             Test Item    Value        Reference Range Interpretation Comments

 

             Urine Urobilinogen (test code = 65545-0) 0.2          0.2-1        

              





[facility] Dbutsoudy3259-35-92 17:56:00* 



             Test Item    Value        Reference Range Interpretation Comments

 

             Urine Bilirubin (test code = 1977-8) NEGATIVE     NEGATIVE         

          





[facility] [id number] 17[facility]56[facility]00* 



             Test Item    Value        Reference Range Interpretation Comments

 

             Urine Blood (test code = 69622-0) NEGATIVE     NEGATIVE            

       





[facility] [id number] 17:21:00           
                                                                           [place] [address]      Patient Name: JARRETT BEY         
                          MR #: [id number]                     : 1935  
                                 Age/Sex: 83/F  Acct #: [id number]            
                  Req #: [id number]  Adm Physician:                             
                         Ordered by: NICHOLAS NEIL NP                        
    Report #: [id number]        Location: ER                                     
 Room/Bed:                     ____________________________________________
_______________________________________________________    Procedure: [id number] 
CT/CT BRAIN WO  Exam Date: 19                            Exam Time:   
                                            REPORT STATUS: Signed    Examination
: CT head without contrast   Clinical Indication: Fainting. Headache.   Techniqu
e: Transaxial noncontrast images from the skull base through the vertex   were o
btained. Sagittal and coronal reformatted images were done.   Dose modulation, i
terative reconstruction, and/or weight based adjustment of   the mA/kV was utili
zed to reduce the radiation dose to as low as reasonably   achievable.    Compar
rich: None.      Findings:      Scalp: No abnormalities.   Bones: Intact. No fra
ctures.  No blastic or lytic lesions.       Brain sulci: Appropriate for patient
's age.   Ventricles: Normal in size and configuration.   No hydrocephalus.  .  
    Extra-axial space:   No abnormalities.      Parenchyma:    There are mild co
nfluent areas of low-attenuation within subcortical and   periventricular white 
matter, nonspecific, but could represent microvascular   ischemic disease.   No 
masses, hemorrhage, or acute or chronic cortical based vascular insults.      Georges
prasellar region: No abnormalities.   Craniocervical junction: The foramen magnu
m is patent.  No Chiari one   malformation.      Incidental findings:    Atheros
clerotic calcification of the supraclinoid internal carotid arteries.      Impre
ssion:      1.  No acute intracranial finding.      2.  Mild chronic microvascul
ar ischemic change.      Signed by: Dr. Ethan Panchal M.D. on 2019 5:25 
PM        Dictated By: ETHAN GARCIA MD  Electronically Signed By: ASIA GARCIA MD on 19  Transcribed By: PAWEL on 19       COPY TO:   NICHOLAS NEIL NP         Prothrombin [id number] 
17:12:00* 



             Test Item    Value        Reference Range Interpretation Comments

 

             Prothrombin Time (test code = 5902-2) 12.7         11.9-14.5       

           





[facility] CenterProth[facility] Time International Ratio
2019 17:12:00* 



             Test Item    Value        Reference Range Interpretation Comments

 

             Prothromb Time International Ratio (test code = 6301-6) 0.91       

                             





Oral Anticoagulant Therapy INR Values:1. Low Intensity Therapy        1.5 - 2.02
. Moderate Intensity Therapy   2.0 - 3.03. High Intensity Therapy(1)    2.5 - 3.
54. High Intensity Therapy(2)    3.0 - 4.05. Panic Value INR              > 5.0
[facility] CenterAct[facility] Thromboplast Time
2019 17:12:00* 



             Test Item    Value        Reference Range Interpretation Comments

 

             Activated Partial Thromboplast Time (test code = 12170-8) 25.5     

    23.8-35.5                  





[facility] CenterProthrombin [id number] 17:12:00* 



             Test Item    Value        Reference Range Interpretation Comments

 

             Prothrombin Time (test code = 5902-2) 12.7         11.9-14.5       

           





[facility] Center[facility] International Ratio
2019 17:12:00* 



             Test Item    Value        Reference Range Interpretation Comments

 

             Prothromb Time International Ratio (test code = 6301-6) 0.91       

                             





Oral Anticoagulant Therapy INR Values:1. Low Intensity Therapy        1.5 - 2.02
. Moderate Intensity Therapy   2.0 - 3.03. High Intensity Therapy(1)    2.5 - 3.
54. High Intensity Therapy(2)    3.0 - 4.05. Panic Value INR              > 5.0
[facility] Center[facility]romboplast [facility]
2019 17:12:00* 



             Test Item    Value        Reference Range Interpretation Comments

 

             Activated Partial Thromboplast Time (test code = 37792-2) 25.5     

    23.8-35.5                  





[facility] CenterProth[facility] [id number] 17:12:00* 



             Test Item    Value        Reference Range Interpretation Comments

 

             Prothrombin Time (test code = 5902-2) 12.7         11.9-14.5       

           





[facility] CenterPro[facility] Time International Ratio
2019 17:12:00* 



             Test Item    Value        Reference Range Interpretation Comments

 

             Prothromb Time International Ratio (test code = 6301-6) 0.91       

                             





Oral Anticoagulant Therapy INR Values:1. Low Intensity Therapy        1.5 - 2.02
. Moderate Intensity Therapy   2.0 - 3.03. High Intensity Therapy(1)    2.5 - 3.
54. High Intensity Therapy(2)    3.0 - 4.05. Panic Value INR              > 5.0
[facility] Center[facility] Thromboplast [facility]
2019 17:12:00* 



             Test Item    Value        Reference Range Interpretation Comments

 

             Activated Partial Thromboplast Time (test code = 40733-1) 25.5     

    23.8-35.5                  





[facility] CenterProthrombin [id number] 17:12:00* 



             Test Item    Value        Reference Range Interpretation Comments

 

             Prothrombin Time (test code = 5902-2) 12.7         11.9-14.5       

           





[facility] Center[facility] Time International Ratio
2019 17:12:00* 



             Test Item    Value        Reference Range Interpretation Comments

 

             Prothromb Time International Ratio (test code = 6301-6) 0.91       

                             





Oral Anticoagulant Therapy INR Values:1. Low Intensity Therapy        1.5 - 2.02
. Moderate Intensity Therapy   2.0 - 3.03. High Intensity Therapy(1)    2.5 - 3.
54. High Intensity Therapy(2)    3.0 - 4.05. Panic Value INR              > 5.0
[facility] Thromboplast [facility]
2019 17:12:00* 



             Test Item    Value        Reference Range Interpretation Comments

 

             Activated Partial Thromboplast Time (test code = 26511-6) 25.5     

    23.8-35.5                  





[facility] CenterThy[facility]ulating [facility]ormone (TSH)
2019 17:10:00* 



             Test Item    Value        Reference Range Interpretation Comments

 

             Thyroid Stimulating Hormone (TSH) (test code = 27236-9) 2.774      

  0.350-4.940                





[facility] Center[facility])
2019 17:10:00* 



             Test Item    Value        Reference Range Interpretation Comments

 

             Thyroid Stimulating Hormone (TSH) (test code = 38245-3) 2.774      

  0.350-4.940                





[facility] Center[facility])
2019 17:10:00* 



             Test Item    Value        Reference Range Interpretation Comments

 

             Thyroid Stimulating Hormone (TSH) (test code = 36186-7) 2.774      

  0.350-4.940                





[facility]
2019 17:10:00* 



             Test Item    Value        Reference Range Interpretation Comments

 

             Thyroid Stimulating Hormone (TSH) (test code = 01229-1) 2.774      

  0.350-4.940                





[facility]2019 
17:10:00                                                                        
              [facility]                        [address] [place] [address]      Patient Name: 
JARRETT BEY                                   MR #: [id number]             
        : 1935                                   Age/Sex: 83/F  Acct #:
 [id number]                              Req #: [id number]  Adm Physician:    
                                                  Ordered by: NICHOLAS NEIL 
NP                            Report #: [id number]        Location: ER           
                           Room/Bed:                     
____________________________________________
_______________________________________________________    Procedure: [id number] 
DX/CHEST SINGLE (PORTABLE)  Exam Date: 19                            Exam 
Time: 1701                                              REPORT STATUS: Signed   
 EXAMINATION:  CHEST SINGLE (PORTABLE)          INDICATION: Shortness of breath 
     COMPARISON: None           FINDINGS:      LINES/TUBES:EKG. Sternotomy wires
 intact.      LUNGS:The lungs are moderately inflated. There is marked elevation
 of the right   hemidiaphragm. No focal consolidation or pulmonary edema.      P
LEURA:No pleural effusion or pneumothorax.      MEDIASTINUM:The cardiomediastina
l silhouette appears normal in size and shape.   Atherosclerotic calcifications 
of the thoracic aorta.      BONES/SOFT TISSUES:No acute osseous injury.      ABD
OMEN:No free air under the diaphragm.         IMPRESSION:    No focal pneumonia 
or pulmonary edema.      Marked elevation of the right hemidiaphragm.      Juliet
d by: Lilia Pena MD on 2019 5:12 PM        Dictated By: LILIA PENA MD  Elec
tronically Signed By: LILIA PENA MD on 19  Transcribed By: PAWEL on
 19       COPY TO:   NICHOLAS NEIL NP         B-Type Natriuretic 
[id number] 17:03:00* 



             Test Item    Value        Reference Range Interpretation Comments

 

             B-Type Natriuretic Peptide (test code = 99133-4) 108.7        0-100

        H             





[facility] [id number] 
17:03:00* 



             Test Item    Value        Reference Range Interpretation Comments

 

             B-Type Natriuretic Peptide (test code = 66133-2) 108.7        0-100

        H             





[facility] [id number] 
17:03:00* 



             Test Item    Value        Reference Range Interpretation Comments

 

             B-Type Natriuretic Peptide (test code = 60941-9) 108.7        0-100

        H             





[facility] [id number] 
17:03:00* 



             Test Item    Value        Reference Range Interpretation Comments

 

             B-Type Natriuretic Peptide (test code = 79100-7) 108.7        0-100

        H             





[facility]

## 2020-11-21 NOTE — XMS REPORT
Continuity of Care Document

                             Created on: 2020



MAIDA JARRETTBENTLEY WHITE

External Reference #: [id number]

: 1935

Sex: Female



Demographics





                          Address                   [address] [address]

 

                          Home Phone                [phone]

 

                          Preferred Language        English

 

                          Marital Status            Unknown

 

                          Muslim Affiliation     Unknown

 

                          Race                      Unknown

 

                          Ethnic Group              Unknown





Author





                          Author                    MoonshadoJARRETT              Moons[place]ado

 

                          Address                   Unknown

 

                          Phone                     Unavailable







Care Team Providers





                    Care Team Member Name Role                Phone

 

                    [employer] Unavailable         Un

available



                                    



Problems

                    



                    Problem                         Status                      

   Onset Date       

                          Classification                         Date Reported  

         

                          Comments                         Source               

     

 

                          Right bundle branch block                         Acti

ve                        

                                             Problem                         2016             

                                                    Rakesh Zaman MD, PA        

            

 

                    Aortic valve disorders                         Active       

                    

                          Problem                         2016            

    

                                                    Rakesh Zaman MD, PA        

            

 

                          Chronic diastolic heart failure                       

  Active                  

                                             Problem                         2016       

                                                    Rakesh Zaman MD, PA        

          

 

 

                          Chronic diastolic (congestive) heart failure          

               Active     

                                             Problem                         

2016                                                   Rakesh Zaman MD, P

A

                   

 

                          Unspecified right bundle-branch block                 

        Active            

                                             Problem                         2016 

                                                    Rakesh Zaman MD, PA        

    

       

 

                          Obstructive hypertrophic cardiomyopathy               

          Active          

                                             Problem                         

2016                                                   Rakesh Zaman MD, P

A

                   

 

                    Orthostatic hypotension                         Active      

                    

                          Problem                         2016            

   

                                                    Rakesh Zaman MD, PA        

            

 

                    Angina pectoris                         Active              

                    

                          Problem                         2016            

           

                                                    Rakesh Zaman MD, PA        

            

 

                    Orthostatic hypotension                         Active      

                    

                          Problem                         2016            

   

                                                    Rakesh Zaman MD, PA        

            

 

                          Chronic obstructive pulmonary disease, unspecified    

                     

Active                                                   Problem                

 

                    2016                                                  

Rakesh Zaman MD, PA                    



                                                                                
                                                                                
                                                      



Medications

        



                                        No Data Provided for This Section       

             



                                                                



Allergies, Adverse Reactions, Alerts

        



                                        No Known Medication Allergies           

           



                                                        



Immunizations

        



                                        No Data Provided for This Section



                                     



Results





                                        No Data Provided for This Section



                    



Pathology Reports





                                        No Data Provided for This Section       

             



                            



Diagnostic Reports

            



                                        No Data Provided for This Section       

             



                                                            



Consultation Notes

                    



                                        No Data Provided for This Section       

             



                                                            



Discharge Summaries

                    



                                        No Data Provided for This Section       

             



                                                            



History and Physicals

                    



                                        No Data Provided for This Section       

             



                                                                



Vital Signs

                         



                                        No Data Provided for This Section



                                                                 



Encounters

                    



                    Location                         Location Details           

              

Encounter Type                         Encounter Number                         

Reason For Visit                         Attending Provider                     

                    ADM Date                         DC Date                    

     Status      

                                        Source                    

 

                    Rakesh Zaman MD, [facility]                                        

          Follow-Up 

                                        [id number]    

                 

                                                                        10/07/20

15               

                    10/07/2015                                                  

Rakesh Zaman MD, PA                    

 

                    Rakesh Zaman MD, PA                                        

          reschedule

appt                                    [id number]    

           

                                                                        11/10/20

16         

                    11/10/2016                                                  

Rakesh Zaman MD, PA                    



                                                                                
   



Procedures

        



                                        No Data Provided for This Section



                                                    



Assessment and Plan

                    



                                        No Data Provided for This Section       

             



                                     



Plan of Care





                                        No Data Provided for This Section       

             



                                                                



Social History

                    



                    Social History                         Date                 

        Source      

             

 

                                        Social History ElementQualifiersDate Rep

orted

Tobacco Use:

                                        .  Are you a: former smoker quit around 

1990

Oct 07, 2015

Marital Status:

                                        .   as of 2013.

Oct 07, 2015

Do you drink alcohol?

                                        .  Status: No

Oct 07, 2015

                          10/07/2015                         Rakesh Zaman MD, P

A

                   



                                                                    



Family History

                    



                                        No Data Provided for This Section       

             



                                                            



Advance Directives

                    



                                        No Data Provided for This Section       

             



                                                            



Functional Status

                    



                                        No Data Provided for This Section

## 2022-01-14 LAB
BASOPHILS # BLD AUTO: 0.1 10*3/UL (ref 0–0.1)
BASOPHILS NFR BLD AUTO: 1 % (ref 0–1)
DEPRECATED NEUTROPHILS # BLD AUTO: 4.6 10*3/UL (ref 2.1–6.9)
EOSINOPHIL # BLD AUTO: 0.3 10*3/UL (ref 0–0.4)
EOSINOPHIL NFR BLD AUTO: 4 % (ref 0–6)
ERYTHROCYTE [DISTWIDTH] IN CORD BLOOD: 18.6 % (ref 11.7–14.4)
HCT VFR BLD AUTO: 30.6 % (ref 34.2–44.1)
HGB BLD-MCNC: 8.7 G/DL (ref 12–16)
LYMPHOCYTES # BLD: 1.7 10*3/UL (ref 1–3.2)
LYMPHOCYTES NFR BLD AUTO: 23.6 % (ref 18–39.1)
MCH RBC QN AUTO: 23.1 PG (ref 28–32)
MCHC RBC AUTO-ENTMCNC: 28.4 G/DL (ref 31–35)
MCV RBC AUTO: 81.2 FL (ref 81–99)
MONOCYTES # BLD AUTO: 0.5 10*3/UL (ref 0.2–0.8)
MONOCYTES NFR BLD AUTO: 7.4 % (ref 4.4–11.3)
NEUTS SEG NFR BLD AUTO: 63.7 % (ref 38.7–80)
PLATELET # BLD AUTO: 367 X10E3/UL (ref 140–360)
RBC # BLD AUTO: 3.77 X10E6/UL (ref 3.6–5.1)

## 2022-01-17 ENCOUNTER — HOSPITAL ENCOUNTER (OUTPATIENT)
Dept: HOSPITAL 88 - ENDO | Age: 87
Discharge: HOME | End: 2022-01-17
Attending: INTERNAL MEDICINE
Payer: MEDICARE

## 2022-01-17 VITALS — DIASTOLIC BLOOD PRESSURE: 74 MMHG | SYSTOLIC BLOOD PRESSURE: 143 MMHG

## 2022-01-17 DIAGNOSIS — Z20.822: ICD-10-CM

## 2022-01-17 DIAGNOSIS — Z80.0: ICD-10-CM

## 2022-01-17 DIAGNOSIS — K21.9: ICD-10-CM

## 2022-01-17 DIAGNOSIS — Z79.899: ICD-10-CM

## 2022-01-17 DIAGNOSIS — Z01.812: ICD-10-CM

## 2022-01-17 DIAGNOSIS — K29.70: ICD-10-CM

## 2022-01-17 DIAGNOSIS — Z99.81: ICD-10-CM

## 2022-01-17 DIAGNOSIS — I50.9: ICD-10-CM

## 2022-01-17 DIAGNOSIS — I11.0: ICD-10-CM

## 2022-01-17 DIAGNOSIS — Z01.810: ICD-10-CM

## 2022-01-17 DIAGNOSIS — F41.9: ICD-10-CM

## 2022-01-17 DIAGNOSIS — K22.89: ICD-10-CM

## 2022-01-17 DIAGNOSIS — Z98.84: ICD-10-CM

## 2022-01-17 DIAGNOSIS — K20.90: Primary | ICD-10-CM

## 2022-01-17 DIAGNOSIS — Z79.82: ICD-10-CM

## 2022-01-17 DIAGNOSIS — J44.9: ICD-10-CM

## 2022-01-17 LAB
IRON SATN MFR SERPL: 7 % (ref 15–50)
IRON SERPL-MCNC: 21 UG/DL (ref 50–170)
TIBC SERPL-MCNC: 319 UG/DL (ref 261–478)
TRANSFERRIN SERPL-MCNC: 228 MG/DL (ref 180–382)

## 2022-01-17 PROCEDURE — 84466 ASSAY OF TRANSFERRIN: CPT

## 2022-01-17 PROCEDURE — 43239 EGD BIOPSY SINGLE/MULTIPLE: CPT

## 2022-01-17 PROCEDURE — 93005 ELECTROCARDIOGRAM TRACING: CPT

## 2022-01-17 PROCEDURE — 83540 ASSAY OF IRON: CPT

## 2022-01-17 PROCEDURE — 43245 EGD DILATE STRICTURE: CPT

## 2022-01-17 PROCEDURE — 82746 ASSAY OF FOLIC ACID SERUM: CPT

## 2022-01-17 PROCEDURE — 43450 DILATE ESOPHAGUS 1/MULT PASS: CPT

## 2022-01-17 PROCEDURE — 85045 AUTOMATED RETICULOCYTE COUNT: CPT

## 2022-01-17 PROCEDURE — 36415 COLL VENOUS BLD VENIPUNCTURE: CPT

## 2022-01-17 PROCEDURE — 82607 VITAMIN B-12: CPT

## 2022-01-17 PROCEDURE — 85025 COMPLETE CBC W/AUTO DIFF WBC: CPT
